# Patient Record
Sex: FEMALE | Race: WHITE | NOT HISPANIC OR LATINO | URBAN - METROPOLITAN AREA
[De-identification: names, ages, dates, MRNs, and addresses within clinical notes are randomized per-mention and may not be internally consistent; named-entity substitution may affect disease eponyms.]

---

## 2017-05-05 ENCOUNTER — OFFICE VISIT - HEALTHEAST (OUTPATIENT)
Dept: FAMILY MEDICINE | Facility: CLINIC | Age: 52
End: 2017-05-05

## 2017-05-05 ENCOUNTER — HOSPITAL ENCOUNTER (OUTPATIENT)
Dept: MAMMOGRAPHY | Facility: HOSPITAL | Age: 52
Discharge: HOME | End: 2017-05-05
Attending: FAMILY MEDICINE

## 2017-05-05 DIAGNOSIS — R32 URINARY INCONTINENCE: ICD-10-CM

## 2017-05-05 DIAGNOSIS — E11.9 TYPE 2 DIABETES MELLITUS (H): ICD-10-CM

## 2017-05-05 DIAGNOSIS — Z00.00 ROUTINE GENERAL MEDICAL EXAMINATION AT A HEALTH CARE FACILITY: ICD-10-CM

## 2017-05-05 DIAGNOSIS — E78.5 HYPERLIPIDEMIA: ICD-10-CM

## 2017-05-05 DIAGNOSIS — Z12.31 VISIT FOR SCREENING MAMMOGRAM: ICD-10-CM

## 2017-05-05 ASSESSMENT — MIFFLIN-ST. JEOR: SCORE: 1394.67

## 2017-05-09 ENCOUNTER — HOSPITAL ENCOUNTER (OUTPATIENT)
Dept: MAMMOGRAPHY | Facility: HOSPITAL | Age: 52
Discharge: HOME | End: 2017-05-09
Attending: FAMILY MEDICINE

## 2017-05-09 DIAGNOSIS — N63.0 BREAST MASS: ICD-10-CM

## 2017-08-24 ENCOUNTER — COMMUNICATION - HEALTHEAST (OUTPATIENT)
Dept: FAMILY MEDICINE | Facility: CLINIC | Age: 52
End: 2017-08-24

## 2017-08-28 ENCOUNTER — COMMUNICATION - HEALTHEAST (OUTPATIENT)
Dept: FAMILY MEDICINE | Facility: CLINIC | Age: 52
End: 2017-08-28

## 2017-08-28 ENCOUNTER — OFFICE VISIT - HEALTHEAST (OUTPATIENT)
Dept: FAMILY MEDICINE | Facility: CLINIC | Age: 52
End: 2017-08-28

## 2017-08-28 DIAGNOSIS — E11.9 TYPE 2 DIABETES MELLITUS (H): ICD-10-CM

## 2017-08-28 DIAGNOSIS — F32.9 MAJOR DEPRESSION: ICD-10-CM

## 2017-08-28 DIAGNOSIS — F41.9 ANXIETY: ICD-10-CM

## 2017-08-28 DIAGNOSIS — R53.83 FATIGUE: ICD-10-CM

## 2017-08-28 LAB
HBA1C MFR BLD: 7.7 % (ref 3.5–6)
LDLC SERPL CALC-MCNC: 87 MG/DL

## 2017-08-28 ASSESSMENT — MIFFLIN-ST. JEOR: SCORE: 1408.28

## 2017-08-29 ENCOUNTER — COMMUNICATION - HEALTHEAST (OUTPATIENT)
Dept: FAMILY MEDICINE | Facility: CLINIC | Age: 52
End: 2017-08-29

## 2017-09-01 ENCOUNTER — OFFICE VISIT - HEALTHEAST (OUTPATIENT)
Dept: FAMILY MEDICINE | Facility: CLINIC | Age: 52
End: 2017-09-01

## 2017-09-01 DIAGNOSIS — F32.9 MAJOR DEPRESSION: ICD-10-CM

## 2017-09-01 DIAGNOSIS — F41.9 ANXIETY: ICD-10-CM

## 2017-09-01 DIAGNOSIS — M54.2 NECK PAIN: ICD-10-CM

## 2017-09-01 ASSESSMENT — MIFFLIN-ST. JEOR: SCORE: 1408.28

## 2017-09-08 ENCOUNTER — OFFICE VISIT - HEALTHEAST (OUTPATIENT)
Dept: FAMILY MEDICINE | Facility: CLINIC | Age: 52
End: 2017-09-08

## 2017-09-08 ENCOUNTER — COMMUNICATION - HEALTHEAST (OUTPATIENT)
Dept: FAMILY MEDICINE | Facility: CLINIC | Age: 52
End: 2017-09-08

## 2017-09-08 DIAGNOSIS — F41.9 ANXIETY: ICD-10-CM

## 2017-09-08 DIAGNOSIS — F32.9 MAJOR DEPRESSION: ICD-10-CM

## 2017-09-08 ASSESSMENT — MIFFLIN-ST. JEOR: SCORE: 1408.28

## 2017-10-03 ENCOUNTER — COMMUNICATION - HEALTHEAST (OUTPATIENT)
Dept: SCHEDULING | Facility: CLINIC | Age: 52
End: 2017-10-03

## 2017-10-03 ENCOUNTER — OFFICE VISIT - HEALTHEAST (OUTPATIENT)
Dept: FAMILY MEDICINE | Facility: CLINIC | Age: 52
End: 2017-10-03

## 2017-10-03 DIAGNOSIS — R35.0 URINARY FREQUENCY: ICD-10-CM

## 2017-10-03 DIAGNOSIS — E87.6 HYPOKALEMIA: ICD-10-CM

## 2017-10-04 ENCOUNTER — COMMUNICATION - HEALTHEAST (OUTPATIENT)
Dept: FAMILY MEDICINE | Facility: CLINIC | Age: 52
End: 2017-10-04

## 2017-10-05 ENCOUNTER — COMMUNICATION - HEALTHEAST (OUTPATIENT)
Dept: FAMILY MEDICINE | Facility: CLINIC | Age: 52
End: 2017-10-05

## 2017-10-06 ENCOUNTER — COMMUNICATION - HEALTHEAST (OUTPATIENT)
Dept: FAMILY MEDICINE | Facility: CLINIC | Age: 52
End: 2017-10-06

## 2017-10-06 ENCOUNTER — AMBULATORY - HEALTHEAST (OUTPATIENT)
Dept: FAMILY MEDICINE | Facility: CLINIC | Age: 52
End: 2017-10-06

## 2017-10-06 ENCOUNTER — AMBULATORY - HEALTHEAST (OUTPATIENT)
Dept: LAB | Facility: CLINIC | Age: 52
End: 2017-10-06

## 2017-10-06 DIAGNOSIS — E87.6 HYPOKALEMIA: ICD-10-CM

## 2017-10-13 ENCOUNTER — AMBULATORY - HEALTHEAST (OUTPATIENT)
Dept: LAB | Facility: CLINIC | Age: 52
End: 2017-10-13

## 2017-10-13 DIAGNOSIS — E87.6 HYPOKALEMIA: ICD-10-CM

## 2017-10-24 ENCOUNTER — COMMUNICATION - HEALTHEAST (OUTPATIENT)
Dept: FAMILY MEDICINE | Facility: CLINIC | Age: 52
End: 2017-10-24

## 2017-10-27 ENCOUNTER — AMBULATORY - HEALTHEAST (OUTPATIENT)
Dept: NURSING | Facility: CLINIC | Age: 52
End: 2017-10-27

## 2017-10-27 DIAGNOSIS — Z23 FLU VACCINE NEED: ICD-10-CM

## 2017-12-10 ENCOUNTER — HOSPITAL ENCOUNTER (EMERGENCY)
Facility: CLINIC | Age: 52
Discharge: HOME | End: 2017-12-10
Attending: PHYSICIAN ASSISTANT | Admitting: PHYSICIAN ASSISTANT
Payer: COMMERCIAL

## 2017-12-10 VITALS
HEART RATE: 84 BPM | OXYGEN SATURATION: 95 % | TEMPERATURE: 97.7 F | SYSTOLIC BLOOD PRESSURE: 96 MMHG | HEIGHT: 66 IN | RESPIRATION RATE: 16 BRPM | DIASTOLIC BLOOD PRESSURE: 61 MMHG

## 2017-12-10 DIAGNOSIS — R10.2 PELVIC PAIN IN FEMALE: ICD-10-CM

## 2017-12-10 LAB
ALBUMIN SERPL-MCNC: 3.5 G/DL (ref 3.4–5)
ALBUMIN UR-MCNC: NEGATIVE MG/DL
ALP SERPL-CCNC: 63 U/L (ref 40–150)
ALT SERPL W P-5'-P-CCNC: 18 U/L (ref 0–50)
ANION GAP SERPL CALCULATED.3IONS-SCNC: 8 MMOL/L (ref 3–14)
APPEARANCE UR: ABNORMAL
AST SERPL W P-5'-P-CCNC: 16 U/L (ref 0–45)
BACTERIA #/AREA URNS HPF: ABNORMAL /HPF
BASOPHILS # BLD AUTO: 0 10E9/L (ref 0–0.2)
BASOPHILS NFR BLD AUTO: 0.5 %
BILIRUB SERPL-MCNC: 0.5 MG/DL (ref 0.2–1.3)
BILIRUB UR QL STRIP: NEGATIVE
BUN SERPL-MCNC: 12 MG/DL (ref 7–30)
CALCIUM SERPL-MCNC: 8 MG/DL (ref 8.5–10.1)
CHLORIDE SERPL-SCNC: 108 MMOL/L (ref 94–109)
CO2 SERPL-SCNC: 24 MMOL/L (ref 20–32)
COLOR UR AUTO: YELLOW
CREAT SERPL-MCNC: 0.68 MG/DL (ref 0.52–1.04)
DIFFERENTIAL METHOD BLD: NORMAL
EOSINOPHIL # BLD AUTO: 0.2 10E9/L (ref 0–0.7)
EOSINOPHIL NFR BLD AUTO: 2.2 %
ERYTHROCYTE [DISTWIDTH] IN BLOOD BY AUTOMATED COUNT: 12.9 % (ref 10–15)
GFR SERPL CREATININE-BSD FRML MDRD: >90 ML/MIN/1.7M2
GLUCOSE SERPL-MCNC: 209 MG/DL (ref 70–99)
GLUCOSE UR STRIP-MCNC: NEGATIVE MG/DL
HCT VFR BLD AUTO: 39.5 % (ref 35–47)
HGB BLD-MCNC: 13.8 G/DL (ref 11.7–15.7)
HGB UR QL STRIP: NEGATIVE
IMM GRANULOCYTES # BLD: 0 10E9/L (ref 0–0.4)
IMM GRANULOCYTES NFR BLD: 0.2 %
KETONES UR STRIP-MCNC: NEGATIVE MG/DL
LEUKOCYTE ESTERASE UR QL STRIP: NEGATIVE
LIPASE SERPL-CCNC: 244 U/L (ref 73–393)
LYMPHOCYTES # BLD AUTO: 2.1 10E9/L (ref 0.8–5.3)
LYMPHOCYTES NFR BLD AUTO: 26.2 %
MCH RBC QN AUTO: 30.7 PG (ref 26.5–33)
MCHC RBC AUTO-ENTMCNC: 34.9 G/DL (ref 31.5–36.5)
MCV RBC AUTO: 88 FL (ref 78–100)
MONOCYTES # BLD AUTO: 0.7 10E9/L (ref 0–1.3)
MONOCYTES NFR BLD AUTO: 8.3 %
MUCOUS THREADS #/AREA URNS LPF: PRESENT /LPF
NEUTROPHILS # BLD AUTO: 5 10E9/L (ref 1.6–8.3)
NEUTROPHILS NFR BLD AUTO: 62.6 %
NITRATE UR QL: NEGATIVE
PH UR STRIP: 6 PH (ref 5–7)
PLATELET # BLD AUTO: 249 10E9/L (ref 150–450)
POTASSIUM SERPL-SCNC: 3 MMOL/L (ref 3.4–5.3)
PROT SERPL-MCNC: 6.9 G/DL (ref 6.8–8.8)
RBC # BLD AUTO: 4.49 10E12/L (ref 3.8–5.2)
RBC #/AREA URNS AUTO: 1 /HPF (ref 0–2)
SODIUM SERPL-SCNC: 140 MMOL/L (ref 133–144)
SOURCE: ABNORMAL
SP GR UR STRIP: 1.02 (ref 1–1.03)
SPECIMEN SOURCE: NORMAL
SQUAMOUS #/AREA URNS AUTO: 6 /HPF (ref 0–1)
UROBILINOGEN UR STRIP-MCNC: NORMAL MG/DL (ref 0–2)
WBC # BLD AUTO: 8.1 10E9/L (ref 4–11)
WBC #/AREA URNS AUTO: 1 /HPF (ref 0–2)
WET PREP SPEC: NORMAL

## 2017-12-10 PROCEDURE — 99284 EMERGENCY DEPT VISIT MOD MDM: CPT | Mod: 25 | Performed by: PHYSICIAN ASSISTANT

## 2017-12-10 PROCEDURE — 80053 COMPREHEN METABOLIC PANEL: CPT | Performed by: PHYSICIAN ASSISTANT

## 2017-12-10 PROCEDURE — 93005 ELECTROCARDIOGRAM TRACING: CPT | Performed by: PHYSICIAN ASSISTANT

## 2017-12-10 PROCEDURE — 93010 ELECTROCARDIOGRAM REPORT: CPT | Mod: Z6 | Performed by: PHYSICIAN ASSISTANT

## 2017-12-10 PROCEDURE — 87210 SMEAR WET MOUNT SALINE/INK: CPT | Performed by: PHYSICIAN ASSISTANT

## 2017-12-10 PROCEDURE — 85025 COMPLETE CBC W/AUTO DIFF WBC: CPT | Performed by: PHYSICIAN ASSISTANT

## 2017-12-10 PROCEDURE — 25000128 H RX IP 250 OP 636: Performed by: PHYSICIAN ASSISTANT

## 2017-12-10 PROCEDURE — 83690 ASSAY OF LIPASE: CPT | Performed by: PHYSICIAN ASSISTANT

## 2017-12-10 PROCEDURE — 81001 URINALYSIS AUTO W/SCOPE: CPT | Performed by: PHYSICIAN ASSISTANT

## 2017-12-10 PROCEDURE — 96374 THER/PROPH/DIAG INJ IV PUSH: CPT | Performed by: PHYSICIAN ASSISTANT

## 2017-12-10 RX ORDER — METFORMIN HCL 500 MG
2000 TABLET, EXTENDED RELEASE 24 HR ORAL
COMMUNITY

## 2017-12-10 RX ORDER — POLYETHYLENE GLYCOL 3350 17 G
2 POWDER IN PACKET (EA) ORAL
COMMUNITY

## 2017-12-10 RX ORDER — KETOROLAC TROMETHAMINE 30 MG/ML
30 INJECTION, SOLUTION INTRAMUSCULAR; INTRAVENOUS ONCE
Status: COMPLETED | OUTPATIENT
Start: 2017-12-10 | End: 2017-12-10

## 2017-12-10 RX ADMIN — KETOROLAC TROMETHAMINE 30 MG: 30 INJECTION, SOLUTION INTRAMUSCULAR at 18:06

## 2017-12-10 NOTE — ED NOTES
Pt c/o lower abdominal pain - states feels like heavy pressure in bladder area - states has had bladder sling placed several years ago - states uretheral pain/ burning with urination. Patient denies any vomiting - states she normally has loose stools so, constipation is not of concern.

## 2017-12-10 NOTE — ED AVS SNAPSHOT
[facility] Emergency Department    [address] [place] [address]    Phone:  [phone]    Fax:  [phone]                                       Deyanira Kelley   MRN: [id number]    Department:  [facility] Emergency Department   Date of Visit:  12/10/2017           Patient Information     Date Of Birth          1965        Your diagnoses for this visit were:     Pelvic pain in female        You were seen by Jackie Teran PA-C.      Follow-up Information     Follow up with Israel Alva MD In 3 days.    Specialty:  Family Practice    Why:  if no improvement or sooner if new or worsening symptoms     Contact information:    Replaced by [facility]  [address]  [facility] [address]  [phone]        Discharge References/Attachments     PELVIC PAIN, UNKNOWN CAUSE (ENGLISH)      24 Hour Appointment Hotline       To make an appointment at any [facility], call [phone] ([phone]). If you don't have a family doctor or clinic, we will help you find one. [facility] are conveniently located to serve the needs of you and your family.             Review of your medicines      Our records show that you are taking the medicines listed below. If these are incorrect, please call your family doctor or clinic.        Dose / Directions Last dose taken    ATORVASTATIN CALCIUM PO   Dose:  20 mg        Take 20 mg by mouth daily   Refills:  0        GLIPIZIDE XL PO   Dose:  15 mg        Take 15 mg by mouth daily (with breakfast)   Refills:  0        metFORMIN 500 MG 24 hr tablet   Commonly known as:  GLUCOPHAGE-XR   Dose:  2000 mg        Take 2,000 mg by mouth daily (with breakfast)   Refills:  0        NICOTINE MINI 2 MG lozenge   Dose:  2 mg   Generic drug:  nicotine polacrilex        Place 2 mg inside cheek every hour as needed for smoking cessation   Refills:  0        POTASSIUM CHLORIDE ER PO   Dose:  10 mEq        Take 10 mEq by mouth daily   Refills:  0        SERTRALINE  HCL PO   Dose:  100 mg        Take 100 mg by mouth daily   Refills:  0                Procedures and tests performed during your visit     CBC with platelets differential    Comprehensive metabolic panel    EKG 12 lead    Lipase    UA reflex to Microscopic and Culture    Wet prep      Orders Needing Specimen Collection     None      Pending Results     No orders found from 12/8/2017 to 12/11/2017.            Pending Culture Results     No orders found from 12/8/2017 to 12/11/2017.            Pending Results Instructions     If you had any lab results that were not finalized at the time of your Discharge, you can call the ED Lab Result RN at [phone]. You will be contacted by this team for any positive Lab results or changes in treatment. The nurses are available 7 days a week from 10A to 6:30P.  You can leave a message 24 hours per day and they will return your call.        Test Results From Your Hospital Stay        12/10/2017  4:52 PM      Component Results     Component Value Ref Range & Units Status    Color Urine Yellow  Final    Appearance Urine Slightly Cloudy  Final    Glucose Urine Negative NEG^Negative mg/dL Final    Bilirubin Urine Negative NEG^Negative Final    Ketones Urine Negative NEG^Negative mg/dL Final    Specific Gravity Urine 1.022 1.003 - 1.035 Final    Blood Urine Negative NEG^Negative Final    pH Urine 6.0 5.0 - 7.0 pH Final    Protein Albumin Urine Negative NEG^Negative mg/dL Final    Urobilinogen mg/dL Normal 0.0 - 2.0 mg/dL Final    Nitrite Urine Negative NEG^Negative Final    Leukocyte Esterase Urine Negative NEG^Negative Final    Source Midstream Urine  Final    RBC Urine 1 0 - 2 /HPF Final    WBC Urine 1 0 - 2 /HPF Final    Bacteria Urine Few (A) NEG^Negative /HPF Final    Squamous Epithelial /HPF Urine 6 (H) 0 - 1 /HPF Final    Mucous Urine Present (A) NEG^Negative /LPF Final         12/10/2017  5:17 PM      Component Results     Component Value Ref Range & Units Status    WBC 8.1  4.0 - 11.0 10e9/L Final    RBC Count 4.49 3.8 - 5.2 10e12/L Final    Hemoglobin 13.8 11.7 - 15.7 g/dL Final    Hematocrit 39.5 35.0 - 47.0 % Final    MCV 88 78 - 100 fl Final    MCH 30.7 26.5 - 33.0 pg Final    MCHC 34.9 31.5 - 36.5 g/dL Final    RDW 12.9 10.0 - 15.0 % Final    Platelet Count 249 150 - 450 10e9/L Final    Diff Method Automated Method  Final    % Neutrophils 62.6 % Final    % Lymphocytes 26.2 % Final    % Monocytes 8.3 % Final    % Eosinophils 2.2 % Final    % Basophils 0.5 % Final    % Immature Granulocytes 0.2 % Final    Absolute Neutrophil 5.0 1.6 - 8.3 10e9/L Final    Absolute Lymphocytes 2.1 0.8 - 5.3 10e9/L Final    Absolute Monocytes 0.7 0.0 - 1.3 10e9/L Final    Absolute Eosinophils 0.2 0.0 - 0.7 10e9/L Final    Absolute Basophils 0.0 0.0 - 0.2 10e9/L Final    Abs Immature Granulocytes 0.0 0 - 0.4 10e9/L Final         12/10/2017  5:28 PM      Component Results     Component Value Ref Range & Units Status    Sodium 140 133 - 144 mmol/L Final    Potassium 3.0 (L) 3.4 - 5.3 mmol/L Final    Chloride 108 94 - 109 mmol/L Final    Carbon Dioxide 24 20 - 32 mmol/L Final    Anion Gap 8 3 - 14 mmol/L Final    Glucose 209 (H) 70 - 99 mg/dL Final    Urea Nitrogen 12 7 - 30 mg/dL Final    Creatinine 0.68 0.52 - 1.04 mg/dL Final    GFR Estimate >90 >60 mL/min/1.7m2 Final    Non  GFR Calc    GFR Estimate If Black >90 >60 mL/min/1.7m2 Final    African American GFR Calc    Calcium 8.0 (L) 8.5 - 10.1 mg/dL Final    Bilirubin Total 0.5 0.2 - 1.3 mg/dL Final    Albumin 3.5 3.4 - 5.0 g/dL Final    Protein Total 6.9 6.8 - 8.8 g/dL Final    Alkaline Phosphatase 63 40 - 150 U/L Final    ALT 18 0 - 50 U/L Final    AST 16 0 - 45 U/L Final         12/10/2017  5:28 PM      Component Results     Component Value Ref Range & Units Status    Lipase 244 73 - 393 U/L Final         12/10/2017  7:14 PM      Component Results     Component    Specimen Description    Cervix    Wet Prep    No yeast seen    Wet Prep  "   No Trichomonas seen    Wet Prep    No clue cells seen    Wet Prep    Few  WBC'S seen                  Thank you for choosing [facility]       Thank you for choosing [facility] for your care. Our goal is always to provide you with excellent care. Hearing back from our patients is one way we can continue to improve our services. Please take a few minutes to complete the written survey that you may receive in the mail after you visit with us. Thank you!        [employer] Information     [employer] lets you send messages to your doctor, view your test results, renew your prescriptions, schedule appointments and more. To sign up, go to [email or web address] . Click on \"Log in\" on the left side of the screen, which will take you to the Welcome page. Then click on \"Sign up Now\" on the right side of the page.     You will be asked to enter the access code listed below, as well as some personal information. Please follow the directions to create your username and password.     Your access code is: [email or web address]  Expires: 3/10/2018  7:40 PM     Your access code will  in 90 days. If you need help or a new code, please call your [facility] clinic or [phone].        Care EveryWhere ID     This is your Care EveryWhere ID. This could be used by other organizations to access your [facility] medical records  [id number]        Equal Access to Services     ZHANG ANTOINE : Danette Nevarez, waaxda luqadaha, qaybta kaalmada janessa, reese cobos . So [facility] [phone].    ATENCIÓN: Si habla español, tiene a eden disposición servicios gratuitos de asistencia lingüística. Llame al [phone].    We comply with applicable federal civil rights laws and [place] laws. We do not discriminate on the basis of race, color, national origin, age, disability, sex, sexual orientation, or gender identity.            After Visit Summary       This is your record. Keep this with you and show to your " community pharmacist(s) and doctor(s) at your next visit.

## 2017-12-10 NOTE — ED AVS SNAPSHOT
[facility] Emergency Department    [address] [facility] [address]    Phone:  [phone]    Fax:  [phone]                                       Deyanira Kelley   MRN: [id number]    Department:  [facility] Emergency Department   Date of Visit:  12/10/2017           After Visit Summary Signature Page     I have received my discharge instructions, and my questions have been answered. I have discussed any challenges I see with this plan with the nurse or doctor.    ..........................................................................................................................................  Patient/Patient Representative Signature      ..........................................................................................................................................  Patient Representative Print Name and Relationship to Patient    ..................................................               ................................................  Date                                            Time    ..........................................................................................................................................  Reviewed by Signature/Title    ...................................................              ..............................................  Date                                                            Time

## 2017-12-13 ASSESSMENT — ENCOUNTER SYMPTOMS
COUGH: 0
DIARRHEA: 1
ANAL BLEEDING: 0
SORE THROAT: 0
ABDOMINAL DISTENTION: 0
BLOOD IN STOOL: 0
WHEEZING: 0
UNEXPECTED WEIGHT CHANGE: 0
DIZZINESS: 0
LIGHT-HEADEDNESS: 0
NAUSEA: 0
NECK PAIN: 0
ABDOMINAL PAIN: 0
WOUND: 0
HEMATURIA: 0
APPETITE CHANGE: 0
BACK PAIN: 0
FREQUENCY: 1
FEVER: 0
FLANK PAIN: 1
CHILLS: 1
DIAPHORESIS: 0
DIFFICULTY URINATING: 0
RECTAL PAIN: 0
SHORTNESS OF BREATH: 0
ACTIVITY CHANGE: 0
VOMITING: 0
CONSTIPATION: 0
DYSURIA: 1
COLOR CHANGE: 0

## 2017-12-13 NOTE — ED PROVIDER NOTES
History     Chief Complaint   Patient presents with     Abdominal Pain     started friday night, low abd pain     HPI  Deyanira Kelley is a 52 year old female past medical history significant for depression, hyperlipidemia, hypokalemia who presents to the emergency department with concern over lower pelvic pain which is unpleasant for the last 3 days.  Patient states that pain is generally improved upon waking and gradually worsened throughout the day.  Her pain radiates to the right flank.  She describes it as a sharp.  She has additionally noted some dysuria, increased urinary urgency, chills, myalgias.  She denies any objective fever.  She has not had any significant cough, chest pain, dyspnea, wheezing, vomiting.  She has also noted some vaginal itching, burning last wee which has since improved.  She denies any vaginal discharge. She states that she has a history of lymphocytic colitis and normally has 3-5 small bowel movements per day which are soft to watery this has not changed in frequency, consistency since onset of her pelvic pain.  She has not had any melena, hematochezia.  She has not attempted any OTC treatment today.  Her past surgical history is significant for a bladder sling, hysterectomy in 2009, prior single oophrectomy however patient states one ovary remains.  She believes that she may have had an appendectomy at time of her initial oophorectomy however is not completely sure.    Problem List:    There are no active problems to display for this patient.       Past Medical History:    No past medical history on file.    Past Surgical History:    No past surgical history on file.    Family History:    No family history on file.    Social History:  Marital Status:   [2]  Social History   Substance Use Topics     Smoking status: Not on file     Smokeless tobacco: Not on file     Alcohol use Not on file      Medications:      SERTRALINE HCL PO   GLIPIZIDE XL PO   metFORMIN (GLUCOPHAGE-XR) 500 MG  "24 hr tablet   ATORVASTATIN CALCIUM PO   POTASSIUM CHLORIDE ER PO   nicotine polacrilex (NICOTINE MINI) 2 MG lozenge     Review of Systems   Constitutional: Positive for chills. Negative for activity change, appetite change, diaphoresis, fever and unexpected weight change.   HENT: Negative for congestion and sore throat.    Respiratory: Negative for cough, shortness of breath and wheezing.    Cardiovascular: Negative for chest pain.   Gastrointestinal: Positive for diarrhea (chronic). Negative for abdominal distention, abdominal pain, anal bleeding, blood in stool, constipation, nausea, rectal pain and vomiting.   Genitourinary: Positive for dysuria, flank pain, frequency, pelvic pain and urgency. Negative for decreased urine volume, difficulty urinating, hematuria, vaginal bleeding and vaginal discharge.   Musculoskeletal: Negative for back pain and neck pain.   Skin: Negative for color change, rash and wound.   Neurological: Negative for dizziness and light-headedness.       Physical Exam   BP: 135/85  Pulse: 84  Temp: 97.7  F (36.5  C)  Resp: 16  Height: 167.6 cm (5' 6\")  SpO2: 98 %    Physical Exam   Constitutional: She is oriented to person, place, and time. She appears well-developed and well-nourished. She appears distressed (mild patient appears in pain).   HENT:   Head: Normocephalic and atraumatic.   Mouth/Throat: No oropharyngeal exudate.   Eyes: Conjunctivae and EOM are normal. Pupils are equal, round, and reactive to light.   Neck: Normal range of motion.   Cardiovascular: Normal rate, regular rhythm and normal heart sounds.  Exam reveals no gallop and no friction rub.    No murmur heard.  Pulmonary/Chest: Effort normal and breath sounds normal. No respiratory distress. She has no wheezes. She has no rales.   Abdominal: Soft. Bowel sounds are normal. There is no hepatosplenomegaly. There is tenderness (suprabubic ). There is no rebound, no guarding and no CVA tenderness. No hernia.   Genitourinary: " Rectum normal. Rectal exam shows no mass and no tenderness. Pelvic exam was performed with patient prone. No erythema, tenderness or bleeding in the vagina. No foreign body in the vagina. No signs of injury around the vagina. Vaginal discharge (small amount of thick white) found.   Neurological: She is alert and oriented to person, place, and time.   Skin: Skin is warm and dry. No rash noted. No erythema.       ED Course     ED Course     Procedures         EKG Interpretation:      Interpreted by Jackie Teran and Dr. Marcus Schneider   Time reviewed: 5:45   Symptoms at time of EKG: pelvic pain, hypokalemia noted on blood work    Rhythm: normal sinus   Rate: normal  Axis: NORMAL  Ectopy: none  Conduction: normal  ST Segments/ T Waves: Nonspecific T wave changes  Q Waves: none  Comparison to prior: No old EKG available    Clinical Impression: normal EKG            Critical Care time:  none          Labs Ordered and Resulted from Time of ED Arrival Up to the Time of Departure from the ED   UA MACROSCOPIC WITH REFLEX TO MICRO AND CULTURE - Abnormal; Notable for the following:        Result Value    Bacteria Urine Few (*)     Squamous Epithelial /HPF Urine 6 (*)     Mucous Urine Present (*)     All other components within normal limits   COMPREHENSIVE METABOLIC PANEL - Abnormal; Notable for the following:     Potassium 3.0 (*)     Glucose 209 (*)     Calcium 8.0 (*)     All other components within normal limits   CBC WITH PLATELETS DIFFERENTIAL   LIPASE   WET PREP     Assessments & Plan (with Medical Decision Making)     I have reviewed the nursing notes.    I have reviewed the findings, diagnosis, plan and need for follow up with the patient.       Discharge Medication List as of 12/10/2017  7:41 PM        Final diagnoses:   Pelvic pain in female     52-year-old female presents to the emergency department with concern over 3 day history of pelvic pain which is improved upon awakening and gradually worsened throughout the  day.  Patient had stable vital signs upon arrival.  Physical exam findings as described above.  As part of evaluation to have laboratory tests including CBC which was normal, CMP did show hypokalemia, elevated glucose consistent with patient's past medical history.  Lipase and wet prep were negative.  Urinalysis did show some bacteria however was contaminated with skin was not definitive for infection and it was not typical of pyelonephritis.  I will defer antibiotics pending urine culture.  There was no hematuria noted on urinalysis and her pain is not typical for nephrolithiasis symptoms.  Symptoms are consistent with pelvic floor pain.  Differential would also include her lymphocytic colitis.  I did discuss case with ER physician Dr. Marcus Schneider who reviewed laboratory testing and did not recommend imaging at this time.  Patient was given single dose of Toradol in the department with some improvement of her pain however no complete resolution.  She was discharged home stable with instructions to follow-up with her primary care provider within the next 3-5 days.  Worrisome reasons to return to ER sooner discussed.      Disclaimer: This note consists of symbols derived from keyboarding, dictation, and/or voice recognition software. As a result, there may be errors in the script that have gone undetected.  Please consider this when interpreting information found in the chart.    12/10/2017   [facility] EMERGENCY DEPARTMENT     Jackie Teran PA-C  12/13/17 0701

## 2017-12-14 ENCOUNTER — AMBULATORY - HEALTHEAST (OUTPATIENT)
Dept: FAMILY MEDICINE | Facility: CLINIC | Age: 52
End: 2017-12-14

## 2017-12-14 ENCOUNTER — OFFICE VISIT - HEALTHEAST (OUTPATIENT)
Dept: FAMILY MEDICINE | Facility: CLINIC | Age: 52
End: 2017-12-14

## 2017-12-14 DIAGNOSIS — K59.00 CONSTIPATION: ICD-10-CM

## 2017-12-14 DIAGNOSIS — R10.9 ABDOMINAL PAIN: ICD-10-CM

## 2017-12-15 ENCOUNTER — COMMUNICATION - HEALTHEAST (OUTPATIENT)
Dept: FAMILY MEDICINE | Facility: CLINIC | Age: 52
End: 2017-12-15

## 2018-04-26 ENCOUNTER — AMBULATORY - HEALTHEAST (OUTPATIENT)
Dept: SLEEP MEDICINE | Facility: CLINIC | Age: 53
End: 2018-04-26

## 2018-04-26 DIAGNOSIS — G47.33 OBSTRUCTIVE SLEEP APNEA SYNDROME: ICD-10-CM

## 2018-04-27 ENCOUNTER — RECORDS - HEALTHEAST (OUTPATIENT)
Dept: ADMINISTRATIVE | Facility: OTHER | Age: 53
End: 2018-04-27

## 2018-04-30 ENCOUNTER — RECORDS - HEALTHEAST (OUTPATIENT)
Dept: ADMINISTRATIVE | Facility: OTHER | Age: 53
End: 2018-04-30

## 2018-06-15 ENCOUNTER — OFFICE VISIT - HEALTHEAST (OUTPATIENT)
Dept: SLEEP MEDICINE | Facility: CLINIC | Age: 53
End: 2018-06-15

## 2018-06-15 DIAGNOSIS — G47.00 INSOMNIA: ICD-10-CM

## 2018-06-15 DIAGNOSIS — Z91.89 AT RISK FOR SLEEP APNEA: ICD-10-CM

## 2018-06-15 DIAGNOSIS — R06.83 SNORING: ICD-10-CM

## 2018-06-15 DIAGNOSIS — G47.10 HYPERSOMNIA: ICD-10-CM

## 2018-06-15 ASSESSMENT — MIFFLIN-ST. JEOR: SCORE: 1430.96

## 2018-07-11 ENCOUNTER — HOSPITAL ENCOUNTER (EMERGENCY)
Facility: CLINIC | Age: 53
Discharge: HOME | End: 2018-07-11
Attending: FAMILY MEDICINE | Admitting: FAMILY MEDICINE
Payer: COMMERCIAL

## 2018-07-11 ENCOUNTER — APPOINTMENT (OUTPATIENT)
Dept: CT IMAGING | Facility: CLINIC | Age: 53
End: 2018-07-11
Attending: FAMILY MEDICINE
Payer: COMMERCIAL

## 2018-07-11 VITALS
SYSTOLIC BLOOD PRESSURE: 146 MMHG | WEIGHT: 180 LBS | RESPIRATION RATE: 20 BRPM | BODY MASS INDEX: 29.05 KG/M2 | TEMPERATURE: 98.4 F | OXYGEN SATURATION: 98 % | DIASTOLIC BLOOD PRESSURE: 87 MMHG

## 2018-07-11 DIAGNOSIS — M54.50 ACUTE EXACERBATION OF CHRONIC LOW BACK PAIN: ICD-10-CM

## 2018-07-11 DIAGNOSIS — G89.29 ACUTE EXACERBATION OF CHRONIC LOW BACK PAIN: ICD-10-CM

## 2018-07-11 PROCEDURE — 96361 HYDRATE IV INFUSION ADD-ON: CPT | Performed by: FAMILY MEDICINE

## 2018-07-11 PROCEDURE — 96374 THER/PROPH/DIAG INJ IV PUSH: CPT | Performed by: FAMILY MEDICINE

## 2018-07-11 PROCEDURE — 99285 EMERGENCY DEPT VISIT HI MDM: CPT | Mod: 25 | Performed by: FAMILY MEDICINE

## 2018-07-11 PROCEDURE — 72131 CT LUMBAR SPINE W/O DYE: CPT

## 2018-07-11 PROCEDURE — 96375 TX/PRO/DX INJ NEW DRUG ADDON: CPT | Performed by: FAMILY MEDICINE

## 2018-07-11 PROCEDURE — 99284 EMERGENCY DEPT VISIT MOD MDM: CPT | Mod: Z6 | Performed by: FAMILY MEDICINE

## 2018-07-11 PROCEDURE — 25000128 H RX IP 250 OP 636: Performed by: FAMILY MEDICINE

## 2018-07-11 RX ORDER — OXYCODONE HYDROCHLORIDE 5 MG/1
5 TABLET ORAL EVERY 6 HOURS PRN
Qty: 12 TABLET | Refills: 0 | Status: SHIPPED | OUTPATIENT
Start: 2018-07-11

## 2018-07-11 RX ORDER — ONDANSETRON 2 MG/ML
4 INJECTION INTRAMUSCULAR; INTRAVENOUS ONCE
Status: COMPLETED | OUTPATIENT
Start: 2018-07-11 | End: 2018-07-11

## 2018-07-11 RX ORDER — DICLOFENAC SODIUM 75 MG/1
75 TABLET, DELAYED RELEASE ORAL 2 TIMES DAILY PRN
Qty: 30 TABLET | Refills: 0 | Status: SHIPPED | OUTPATIENT
Start: 2018-07-11

## 2018-07-11 RX ADMIN — SODIUM CHLORIDE 1000 ML: 9 INJECTION, SOLUTION INTRAVENOUS at 04:49

## 2018-07-11 RX ADMIN — ONDANSETRON HYDROCHLORIDE 4 MG: 2 INJECTION, SOLUTION INTRAMUSCULAR; INTRAVENOUS at 04:50

## 2018-07-11 RX ADMIN — HYDROMORPHONE HYDROCHLORIDE 1 MG: 1 INJECTION, SOLUTION INTRAMUSCULAR; INTRAVENOUS; SUBCUTANEOUS at 04:50

## 2018-07-11 ASSESSMENT — PAIN DESCRIPTION - DESCRIPTORS: DESCRIPTORS: ACHING

## 2018-07-11 NOTE — LETTER
July 11, 2018      To Whom It May Concern:      Deyanira Kelley was seen in our Emergency Department today, 07/11/18.  I expect her condition to improve over the next 2-3 days.  She may return to work/school when improved.    Sincerely,        Mark Montalvo MD

## 2018-07-11 NOTE — ED PROVIDER NOTES
History     Chief Complaint   Patient presents with     Back Pain     HPI  Deyanira Kelley is a 53 year old female, past medical history is significant for hypercholesterolemia, type 2 diabetes, presents to the emergency department with concerns of 6 months of back pain.  History is obtained from patient who states that he began with gradual episodic central and slightly right low back pain beginning approximately 6 months prior to presentation.  Approximately 3 months prior to presentation it became worse and was more predominantly in the right low back/SI joint area.  She saw her provider approximately a month ago and had x-ray demonstrating bilateral SI joint arthritis.  She has been using over-the-counter pain medications such as Tylenol and ibuprofen with minimal improvement in pain.  She notes more frequent flareups of the low back pain where tonight she could not sleep and came to the emergency department.  She notes no bowel or bladder dysfunction.  There are no radicular symptoms going down the legs.      Problem List:    There are no active problems to display for this patient.       Past Medical History:    No past medical history on file.    Past Surgical History:    No past surgical history on file.    Family History:    No family history on file.    Social History:  Marital Status:   [2]  Social History   Substance Use Topics     Smoking status: Not on file     Smokeless tobacco: Not on file     Alcohol use Not on file        Medications:      diclofenac (VOLTAREN) 75 MG EC tablet   oxyCODONE IR (ROXICODONE) 5 MG tablet   ATORVASTATIN CALCIUM PO   GLIPIZIDE XL PO   metFORMIN (GLUCOPHAGE-XR) 500 MG 24 hr tablet   nicotine polacrilex (NICOTINE MINI) 2 MG lozenge   POTASSIUM CHLORIDE ER PO   SERTRALINE HCL PO         Review of Systems   All other systems reviewed and are negative.      Physical Exam   BP: 146/87  Heart Rate: 79  Temp: 98.4  F (36.9  C)  Resp: 20  Weight: 81.6 kg (180 lb)  SpO2: 98  %      Physical Exam   Constitutional: She is oriented to person, place, and time. She appears well-developed and well-nourished. She appears distressed.   Does not appear ill or toxic.  She appears uncomfortable due to pain   Abdominal: Soft. Bowel sounds are normal.   Musculoskeletal:        Back:    Neurological: She is alert and oriented to person, place, and time. She has normal reflexes.   Skin: Skin is warm and dry.   Nursing note and vitals reviewed.      ED Course     ED Course     Procedures             Results for orders placed or performed during the hospital encounter of 07/11/18   CT Lumbar Spine w/o Contrast    Narrative    CT LUMBAR SPINE W/O CONTRAST  7/11/2018 5:28 AM      HISTORY: Low back pain.    TECHNIQUE: Multiplanar imaging of the lumbar spine without intravenous  contrast. Radiation dose for this scan was reduced using automated  exposure control, adjustment of the mA and/or kV according to patient  size, or iterative reconstruction technique.     COMPARISON: None.    FINDINGS: There is no acute fracture or malalignment. There is mild  disc height loss at L3-L4. Mild left paracentral disc bulge. There is  mild diffuse disc bulge at L4-L5. There is mild spinal stenosis. There  is no significant foraminal stenosis. There is degenerative facet  sclerosis from L4-S1. The paraspinal soft tissues are unremarkable.      Impression    IMPRESSION: No acute abnormality. Mild multilevel degenerative  disease.    STACY ALVAREZ MD         Critical Care time:  none               No results found for this or any previous visit (from the past 24 hour(s)).    Medications   HYDROmorphone (DILAUDID) injection 1 mg (1 mg Intravenous Given 7/11/18 0450)   ondansetron (ZOFRAN) injection 4 mg (4 mg Intravenous Given 7/11/18 0450)   0.9% sodium chloride BOLUS (0 mLs Intravenous Stopped 7/11/18 0633)       Assessments & Plan (with Medical Decision Making)   53-year-old female past medical history reviewed as above  who presents with concerns of acute worsening of approximately 6 months of low back pain as described in the HPI.  There were no red flags historically and none found on exam.  Given the chronicity and the recent acuity of symptoms imaging was obtained and is reviewed as above noting no acute abnormality.  The patient was treated in the emergency department for her pain successfully to her satisfaction and will be disposition to home with the plan for physical therapy referral which I have placed as well as continued NSAID therapy as baseline with both parents 75 mg twice daily with food, she may use oxycodone for breakthrough sparingly.  She should follow-up in clinic with her primary care provider in the next 1-2 weeks.  If she is not improving further evaluation imaging is indicated.      Disclaimer: This note consists of symbols derived from keyboarding, dictation and/or voice recognition software. As a result, there may be errors in the script that have gone undetected. Please consider this when interpreting information found in this chart.    I have reviewed the nursing notes.    I have reviewed the findings, diagnosis, plan and need for follow up with the patient.       Discharge Medication List as of 7/11/2018  6:57 AM      START taking these medications    Details   diclofenac (VOLTAREN) 75 MG EC tablet Take 1 tablet (75 mg) by mouth 2 times daily as needed for moderate pain, Disp-30 tablet, R-0, Local Print      oxyCODONE IR (ROXICODONE) 5 MG tablet Take 1 tablet (5 mg) by mouth every 6 hours as needed for pain, Disp-12 tablet, R-0, Local Print             Final diagnoses:   Acute exacerbation of chronic low back pain       7/11/2018   [facility] EMERGENCY DEPARTMENT     Jovani Doyle MD  07/17/18 4935

## 2018-07-11 NOTE — ED NOTES
PT is a 53 year old female who presents to the ED with complaints of right lower back which radiates to the right leg. PT placed in room 12, into a gown, on the gurney and on the monitor. PT states back in January she began having back pain for an unknown reason. States since the pain has been coming and going and tonight it is the worse it has been. PT states this episode the pain began yesterday afternoon while at work. States last night she took 3 tylenol and it has not helped the pain. PT is noted to be A&OX4, appear to be in pain with discomfort. All needs are beige assessed and will be met and all comfort measures are being addressed. Awaiting MD parr and orders at this time.

## 2018-07-11 NOTE — ED AVS SNAPSHOT
[facility] Emergency Department    [address] [address]    Phone:  [phone]    Fax:  [phone]                                       Deyanira Kelley   MRN: [id number]    Department:  [facility] Emergency Department   Date of Visit:  7/11/2018           After Visit Summary Signature Page     I have received my discharge instructions, and my questions have been answered. I have discussed any challenges I see with this plan with the nurse or doctor.    ..........................................................................................................................................  Patient/Patient Representative Signature      ..........................................................................................................................................  Patient Representative Print Name and Relationship to Patient    ..................................................               ................................................  Date                                            Time    ..........................................................................................................................................  Reviewed by Signature/Title    ...................................................              ..............................................  Date                                                            Time

## 2018-07-11 NOTE — ED AVS SNAPSHOT
[facility] Emergency Department    [address] [address]    Phone:  [phone]    Fax:  [phone]                                       Deyanira Kelley   MRN: [id number]    Department:  [facility] Emergency Department   Date of Visit:  7/11/2018           Patient Information     Date Of Birth          1965        Your diagnoses for this visit were:     Acute exacerbation of chronic low back pain        You were seen by Jovani Doyle MD.      Follow-up Information     Schedule an appointment as soon as possible for a visit with Israel Pinedo [id number].    Why:  As needed, If symptoms worsen    Contact information:    [address]  [address]  [place] [address]          Discharge Instructions       Back rest times 48 hours.  After the initial 48 hours 20-30 minutes walking on a flat level surface daily.  No lifting or straining.  Call for an appointment with physical therapy in approximately 1 week's time.  I would recommend that you use a medication like diclofenac 75 mg twice daily as baseline pain medication and use the oxycodone prescribed for breakthrough pain only.  Please follow-up in clinic with your primary care provider in the next 2-3 weeks for review.  Return to the emergency department if worse or changes.    24 Hour Appointment Hotline       To make an appointment at any [place] clinic, call [phone] ([phone]). If you don't have a family doctor or clinic, we will help you find one. [place] clinics are conveniently located to serve the needs of you and your family.          ED Discharge Orders     PHYSICAL THERAPY REFERRAL       *This therapy referral will be filtered to a centralized scheduling office at [facility] and the patient will receive a call to schedule an appointment at a [place] location most convenient for them. *     [facility] provides Physical Therapy evaluation and treatment and  "many specialty services across the [place] system.  If requesting a specialty program, please choose from the list below.    If you have not heard from the scheduling office within 2 business days, please call [phone] for all locations, with the exception of [place], please call [phone] and [place], please call [phone]  Treatment: Evaluation & Treatment  Special Instructions/Modalities:   Special Programs: None    Please be aware that coverage of these services is subject to the terms and limitations of your health insurance plan.  Call member services at your health plan with any benefit or coverage questions.      **Note to Provider:  If you are referring outside of [place] for the therapy appointment, please list the name of the location in the \"special instructions\" above, print the referral and give to the patient to schedule the appointment.                     Review of your medicines      START taking        Dose / Directions Last dose taken    diclofenac 75 MG EC tablet   Commonly known as:  VOLTAREN   Dose:  75 mg   Quantity:  30 tablet        Take 1 tablet (75 mg) by mouth 2 times daily as needed for moderate pain   Refills:  0        oxyCODONE IR 5 MG tablet   Commonly known as:  ROXICODONE   Dose:  5 mg   Quantity:  12 tablet        Take 1 tablet (5 mg) by mouth every 6 hours as needed for pain   Refills:  0          Our records show that you are taking the medicines listed below. If these are incorrect, please call your family doctor or clinic.        Dose / Directions Last dose taken    ATORVASTATIN CALCIUM PO   Dose:  20 mg        Take 20 mg by mouth daily   Refills:  0        GLIPIZIDE XL PO   Dose:  15 mg        Take 15 mg by mouth daily (with breakfast)   Refills:  0        metFORMIN 500 MG 24 hr tablet   Commonly known as:  GLUCOPHAGE-XR   Dose:  2000 mg        Take 2,000 mg by mouth daily (with breakfast)   Refills:  0        NICOTINE MINI 2 MG lozenge   Dose:  2 mg   Generic " drug:  nicotine polacrilex        Place 2 mg inside cheek every hour as needed for smoking cessation   Refills:  0        POTASSIUM CHLORIDE ER PO   Dose:  10 mEq        Take 10 mEq by mouth daily   Refills:  0        SERTRALINE HCL PO   Dose:  100 mg        Take 100 mg by mouth daily   Refills:  0                Information about OPIOIDS     PRESCRIPTION OPIOIDS: WHAT YOU NEED TO KNOW   We gave you an opioid (narcotic) pain medicine. It is important to manage your pain, but opioids are not always the best choice. You should first try all the other options your care team gave you. Take this medicine for as short a time (and as few doses) as possible.     These medicines have risks:    DO NOT drive when on new or higher doses of pain medicine. These medicines can affect your alertness and reaction times, and you could be arrested for driving under the influence (DUI). If you need to use opioids long-term, talk to your care team about driving.    DO NOT operate heave machinery    DO NOT do any other dangerous activities while taking these medicines.     DO NOT drink any alcohol while taking these medicines.      If the opioid prescribed includes acetaminophen, DO NOT take with any other medicines that contain acetaminophen. Read all labels carefully. Look for the word  acetaminophen  or  Tylenol.  Ask your pharmacist if you have questions or are unsure.    You can get addicted to pain medicines, especially if you have a history of addiction (chemical, alcohol or substance dependence). Talk to your care team about ways to reduce this risk.    Store your pills in a secure place, locked if possible. We will not replace any lost or stolen medicine. If you don t finish your medicine, please throw away (dispose) as directed by your pharmacist. The [place] [employer]ution [employer] has more information about safe disposal: https://www.pca.state.[place]/living-green/managing-unwanted-medications.     All opioids tend to cause  constipation. Drink plenty of water and eat foods that have a lot of fiber, such as fruits, vegetables, prune juice, apple juice and high-fiber cereal. Take a laxative (Miralax, milk of magnesia, Colace, Senna) if you don t move your bowels at least every other day.         Prescriptions were sent or printed at these locations (2 Prescriptions)                   Other Prescriptions                Printed at Department/Unit printer (2 of 2)         diclofenac (VOLTAREN) 75 MG EC tablet               oxyCODONE IR (ROXICODONE) 5 MG tablet                Procedures and tests performed during your visit     CT Lumbar Spine w/o Contrast      Orders Needing Specimen Collection     None      Pending Results     No orders found from 7/9/2018 to 7/12/2018.            Pending Culture Results     No orders found from 7/9/2018 to 7/12/2018.            Pending Results Instructions     If you had any lab results that were not finalized at the time of your Discharge, you can call the ED Lab Result RN at [phone]. You will be contacted by this team for any positive Lab results or changes in treatment. The nurses are available 7 days a week from 10A to 6:30P.  You can leave a message 24 hours per day and they will return your call.        Test Results From Your Hospital Stay        7/11/2018  6:27 AM      Narrative     CT LUMBAR SPINE W/O CONTRAST  7/11/2018 5:28 AM      HISTORY: Low back pain.    TECHNIQUE: Multiplanar imaging of the lumbar spine without intravenous  contrast. Radiation dose for this scan was reduced using automated  exposure control, adjustment of the mA and/or kV according to patient  size, or iterative reconstruction technique.     COMPARISON: None.    FINDINGS: There is no acute fracture or malalignment. There is mild  disc height loss at L3-L4. Mild left paracentral disc bulge. There is  mild diffuse disc bulge at L4-L5. There is mild spinal stenosis. There  is no significant foraminal stenosis. There is  "degenerative facet  sclerosis from L4-S1. The paraspinal soft tissues are unremarkable.        Impression     IMPRESSION: No acute abnormality. Mild multilevel degenerative  disease.    STACY ALVAREZ MD                Thank you for choosing [facility]       Thank you for choosing [facility] for your care. Our goal is always to provide you with excellent care. Hearing back from our patients is one way we can continue to improve our services. Please take a few minutes to complete the written survey that you may receive in the mail after you visit with us. Thank you!        [employer] Information     [employer] lets you send messages to your doctor, view your test results, renew your prescriptions, schedule appointments and more. To sign up, go to [email or web address] . Click on \"Log in\" on the left side of the screen, which will take you to the Welcome page. Then click on \"Sign up Now\" on the right side of the page.     You will be asked to enter the access code listed below, as well as some personal information. Please follow the directions to create your username and password.     Your access code is: [email or web address]  Expires: 10/9/2018  6:32 AM     Your access code will  in 90 days. If you need help or a new code, please call your [facility] clinic or [phone].        Care EveryWhere ID     This is your Care EveryWhere ID. This could be used by other organizations to access your [facility] medical records  [id number]        Equal Access to Services     [facility] ANTOINE : Hadii tammy Nevarez, wajaymieda itzel, qaybta angelikaalreese ann . So [facility] [phone].    ATENCIÓN: Si habla español, tiene a eden disposición servicios gratuitos de asistencia lingüística. Denise al [phone].    We comply with applicable federal civil rights laws and [place] laws. We do not discriminate on the basis of race, color, national origin, age, disability, sex, sexual orientation, or " gender identity.            After Visit Summary       This is your record. Keep this with you and show to your community pharmacist(s) and doctor(s) at your next visit.

## 2018-07-11 NOTE — DISCHARGE INSTRUCTIONS
Back rest times 48 hours.  After the initial 48 hours 20-30 minutes walking on a flat level surface daily.  No lifting or straining.  Call for an appointment with physical therapy in approximately 1 week's time.  I would recommend that you use a medication like diclofenac 75 mg twice daily as baseline pain medication and use the oxycodone prescribed for breakthrough pain only.  Please follow-up in clinic with your primary care provider in the next 2-3 weeks for review.  Return to the emergency department if worse or changes.

## 2018-07-12 ENCOUNTER — OFFICE VISIT - HEALTHEAST (OUTPATIENT)
Dept: FAMILY MEDICINE | Facility: CLINIC | Age: 53
End: 2018-07-12

## 2018-07-12 ENCOUNTER — HOSPITAL ENCOUNTER (OUTPATIENT)
Dept: PHYSICAL MEDICINE AND REHAB | Facility: CLINIC | Age: 53
Discharge: HOME | End: 2018-07-12
Attending: NURSE PRACTITIONER

## 2018-07-12 DIAGNOSIS — M54.50 RIGHT-SIDED LOW BACK PAIN WITHOUT SCIATICA: ICD-10-CM

## 2018-07-12 DIAGNOSIS — E87.6 HYPOKALEMIA: ICD-10-CM

## 2018-07-12 DIAGNOSIS — M47.816 LUMBAR FACET ARTHROPATHY: ICD-10-CM

## 2018-07-12 DIAGNOSIS — Z12.31 VISIT FOR SCREENING MAMMOGRAM: ICD-10-CM

## 2018-07-12 DIAGNOSIS — M54.41 ACUTE RIGHT-SIDED LOW BACK PAIN WITH RIGHT-SIDED SCIATICA: ICD-10-CM

## 2018-07-12 LAB
ANION GAP SERPL CALCULATED.3IONS-SCNC: 9 MMOL/L (ref 5–18)
BUN SERPL-MCNC: 13 MG/DL (ref 8–22)
CALCIUM SERPL-MCNC: 8.9 MG/DL (ref 8.5–10.5)
CHLORIDE BLD-SCNC: 106 MMOL/L (ref 98–107)
CO2 SERPL-SCNC: 26 MMOL/L (ref 22–31)
CREAT SERPL-MCNC: 0.81 MG/DL (ref 0.6–1.1)
GFR SERPL CREATININE-BSD FRML MDRD: >60 ML/MIN/1.73M2
GLUCOSE BLD-MCNC: 199 MG/DL (ref 70–125)
MAGNESIUM SERPL-MCNC: 2.4 MG/DL (ref 1.8–2.6)
POTASSIUM BLD-SCNC: 4.3 MMOL/L (ref 3.5–5)
SODIUM SERPL-SCNC: 141 MMOL/L (ref 136–145)

## 2018-07-12 ASSESSMENT — MIFFLIN-ST. JEOR: SCORE: 1432.95

## 2018-07-16 ENCOUNTER — RECORDS - HEALTHEAST (OUTPATIENT)
Dept: ADMINISTRATIVE | Facility: OTHER | Age: 53
End: 2018-07-16

## 2018-07-17 ENCOUNTER — RECORDS - HEALTHEAST (OUTPATIENT)
Dept: ADMINISTRATIVE | Facility: OTHER | Age: 53
End: 2018-07-17

## 2018-07-18 ENCOUNTER — HOSPITAL ENCOUNTER (OUTPATIENT)
Dept: PHYSICAL MEDICINE AND REHAB | Facility: CLINIC | Age: 53
Discharge: HOME | End: 2018-07-18
Attending: PHYSICIAN ASSISTANT

## 2018-07-18 ENCOUNTER — COMMUNICATION - HEALTHEAST (OUTPATIENT)
Dept: FAMILY MEDICINE | Facility: CLINIC | Age: 53
End: 2018-07-18

## 2018-07-18 DIAGNOSIS — M79.18 MYOFASCIAL PAIN: ICD-10-CM

## 2018-07-18 DIAGNOSIS — M47.817 FACET ARTHROPATHY, LUMBOSACRAL: ICD-10-CM

## 2018-07-18 DIAGNOSIS — M54.16 LUMBAR RADICULITIS: ICD-10-CM

## 2018-07-18 DIAGNOSIS — M51.26 LUMBAR DISC HERNIATION: ICD-10-CM

## 2018-07-18 DIAGNOSIS — M54.50 LUMBALGIA: ICD-10-CM

## 2018-07-18 ASSESSMENT — MIFFLIN-ST. JEOR: SCORE: 1430.96

## 2018-07-19 ENCOUNTER — OFFICE VISIT - HEALTHEAST (OUTPATIENT)
Dept: PHYSICAL THERAPY | Facility: REHABILITATION | Age: 53
End: 2018-07-19

## 2018-07-19 DIAGNOSIS — M54.16 RIGHT LUMBAR RADICULITIS: ICD-10-CM

## 2018-07-19 DIAGNOSIS — M53.3 SI (SACROILIAC) JOINT DYSFUNCTION: ICD-10-CM

## 2018-07-19 DIAGNOSIS — F41.9 ANXIETY: ICD-10-CM

## 2018-07-19 DIAGNOSIS — M62.81 MUSCLE WEAKNESS (GENERALIZED): ICD-10-CM

## 2018-07-23 ENCOUNTER — AMBULATORY - HEALTHEAST (OUTPATIENT)
Dept: PHYSICAL MEDICINE AND REHAB | Facility: CLINIC | Age: 53
End: 2018-07-23

## 2018-07-25 ENCOUNTER — OFFICE VISIT - HEALTHEAST (OUTPATIENT)
Dept: PHYSICAL THERAPY | Facility: REHABILITATION | Age: 53
End: 2018-07-25

## 2018-07-25 DIAGNOSIS — M54.16 RIGHT LUMBAR RADICULITIS: ICD-10-CM

## 2018-07-25 DIAGNOSIS — M62.81 MUSCLE WEAKNESS (GENERALIZED): ICD-10-CM

## 2018-07-25 DIAGNOSIS — F41.9 ANXIETY: ICD-10-CM

## 2018-07-25 DIAGNOSIS — M53.3 SI (SACROILIAC) JOINT DYSFUNCTION: ICD-10-CM

## 2018-07-27 ENCOUNTER — OFFICE VISIT - HEALTHEAST (OUTPATIENT)
Dept: PHYSICAL THERAPY | Facility: REHABILITATION | Age: 53
End: 2018-07-27

## 2018-07-27 DIAGNOSIS — M53.3 SI (SACROILIAC) JOINT DYSFUNCTION: ICD-10-CM

## 2018-07-27 DIAGNOSIS — M54.16 RIGHT LUMBAR RADICULITIS: ICD-10-CM

## 2018-07-27 DIAGNOSIS — M62.81 MUSCLE WEAKNESS (GENERALIZED): ICD-10-CM

## 2018-07-27 DIAGNOSIS — F41.9 ANXIETY: ICD-10-CM

## 2018-07-30 ENCOUNTER — OFFICE VISIT - HEALTHEAST (OUTPATIENT)
Dept: PHYSICAL THERAPY | Facility: REHABILITATION | Age: 53
End: 2018-07-30

## 2018-07-30 DIAGNOSIS — M62.81 MUSCLE WEAKNESS (GENERALIZED): ICD-10-CM

## 2018-07-30 DIAGNOSIS — M53.3 SI (SACROILIAC) JOINT DYSFUNCTION: ICD-10-CM

## 2018-07-30 DIAGNOSIS — M54.16 RIGHT LUMBAR RADICULITIS: ICD-10-CM

## 2018-07-31 ENCOUNTER — RECORDS - HEALTHEAST (OUTPATIENT)
Dept: RADIOLOGY | Facility: CLINIC | Age: 53
End: 2018-07-31

## 2018-08-02 ENCOUNTER — OFFICE VISIT - HEALTHEAST (OUTPATIENT)
Dept: PHYSICAL THERAPY | Facility: REHABILITATION | Age: 53
End: 2018-08-02

## 2018-08-02 DIAGNOSIS — M54.16 RIGHT LUMBAR RADICULITIS: ICD-10-CM

## 2018-08-02 DIAGNOSIS — M62.81 MUSCLE WEAKNESS (GENERALIZED): ICD-10-CM

## 2018-08-02 DIAGNOSIS — M53.3 SI (SACROILIAC) JOINT DYSFUNCTION: ICD-10-CM

## 2018-08-09 ENCOUNTER — OFFICE VISIT - HEALTHEAST (OUTPATIENT)
Dept: PHYSICAL THERAPY | Facility: REHABILITATION | Age: 53
End: 2018-08-09

## 2018-08-09 DIAGNOSIS — M62.81 MUSCLE WEAKNESS (GENERALIZED): ICD-10-CM

## 2018-08-09 DIAGNOSIS — M53.3 SI (SACROILIAC) JOINT DYSFUNCTION: ICD-10-CM

## 2018-08-09 DIAGNOSIS — F41.9 ANXIETY: ICD-10-CM

## 2018-08-09 DIAGNOSIS — M54.16 RIGHT LUMBAR RADICULITIS: ICD-10-CM

## 2018-08-15 ENCOUNTER — HOSPITAL ENCOUNTER (OUTPATIENT)
Dept: PHYSICAL MEDICINE AND REHAB | Facility: CLINIC | Age: 53
Discharge: HOME | End: 2018-08-15
Attending: NURSE PRACTITIONER

## 2018-08-15 DIAGNOSIS — M54.41 ACUTE RIGHT-SIDED LOW BACK PAIN WITH RIGHT-SIDED SCIATICA: ICD-10-CM

## 2018-08-15 DIAGNOSIS — M53.3 SI (SACROILIAC) JOINT DYSFUNCTION: ICD-10-CM

## 2018-08-15 DIAGNOSIS — M47.817 FACET ARTHROPATHY, LUMBOSACRAL: ICD-10-CM

## 2018-08-17 ENCOUNTER — OFFICE VISIT - HEALTHEAST (OUTPATIENT)
Dept: PHYSICAL THERAPY | Facility: REHABILITATION | Age: 53
End: 2018-08-17

## 2018-08-17 DIAGNOSIS — M53.3 SI (SACROILIAC) JOINT DYSFUNCTION: ICD-10-CM

## 2018-08-17 DIAGNOSIS — M62.81 MUSCLE WEAKNESS (GENERALIZED): ICD-10-CM

## 2018-08-17 DIAGNOSIS — F41.9 ANXIETY: ICD-10-CM

## 2018-08-17 DIAGNOSIS — M54.16 RIGHT LUMBAR RADICULITIS: ICD-10-CM

## 2018-08-30 ENCOUNTER — COMMUNICATION - HEALTHEAST (OUTPATIENT)
Dept: FAMILY MEDICINE | Facility: CLINIC | Age: 53
End: 2018-08-30

## 2018-08-31 ENCOUNTER — OFFICE VISIT - HEALTHEAST (OUTPATIENT)
Dept: PHYSICAL THERAPY | Facility: REHABILITATION | Age: 53
End: 2018-08-31

## 2018-08-31 DIAGNOSIS — M54.16 RIGHT LUMBAR RADICULITIS: ICD-10-CM

## 2018-08-31 DIAGNOSIS — M62.81 MUSCLE WEAKNESS (GENERALIZED): ICD-10-CM

## 2018-08-31 DIAGNOSIS — M53.3 SI (SACROILIAC) JOINT DYSFUNCTION: ICD-10-CM

## 2018-08-31 DIAGNOSIS — F41.9 ANXIETY: ICD-10-CM

## 2018-09-14 ENCOUNTER — OFFICE VISIT - HEALTHEAST (OUTPATIENT)
Dept: PHYSICAL THERAPY | Facility: REHABILITATION | Age: 53
End: 2018-09-14

## 2018-09-14 DIAGNOSIS — M62.81 MUSCLE WEAKNESS (GENERALIZED): ICD-10-CM

## 2018-09-14 DIAGNOSIS — F41.9 ANXIETY: ICD-10-CM

## 2018-09-14 DIAGNOSIS — M54.16 RIGHT LUMBAR RADICULITIS: ICD-10-CM

## 2018-09-14 DIAGNOSIS — M53.3 SI (SACROILIAC) JOINT DYSFUNCTION: ICD-10-CM

## 2018-10-17 ENCOUNTER — COMMUNICATION - HEALTHEAST (OUTPATIENT)
Dept: FAMILY MEDICINE | Facility: CLINIC | Age: 53
End: 2018-10-17

## 2018-11-01 ENCOUNTER — OFFICE VISIT - HEALTHEAST (OUTPATIENT)
Dept: FAMILY MEDICINE | Facility: CLINIC | Age: 53
End: 2018-11-01

## 2018-11-01 DIAGNOSIS — R25.2 LEG CRAMPS: ICD-10-CM

## 2018-11-01 DIAGNOSIS — R19.7 DIARRHEA: ICD-10-CM

## 2018-11-01 DIAGNOSIS — K52.832 LYMPHOCYTIC COLITIS: ICD-10-CM

## 2018-11-01 DIAGNOSIS — N95.1 MENOPAUSAL SYNDROME (HOT FLASHES): ICD-10-CM

## 2018-11-01 DIAGNOSIS — F41.9 ANXIETY: ICD-10-CM

## 2018-11-01 DIAGNOSIS — E11.65 UNCONTROLLED TYPE 2 DIABETES MELLITUS WITH HYPERGLYCEMIA (H): ICD-10-CM

## 2018-11-01 DIAGNOSIS — B07.8 COMMON WART: ICD-10-CM

## 2018-11-01 LAB
ANION GAP SERPL CALCULATED.3IONS-SCNC: 12 MMOL/L (ref 5–18)
BUN SERPL-MCNC: 12 MG/DL (ref 8–22)
CALCIUM SERPL-MCNC: 9.4 MG/DL (ref 8.5–10.5)
CHLORIDE BLD-SCNC: 105 MMOL/L (ref 98–107)
CO2 SERPL-SCNC: 24 MMOL/L (ref 22–31)
CREAT SERPL-MCNC: 0.86 MG/DL (ref 0.6–1.1)
FSH SERPL-ACNC: 61.2 MIU/ML
GFR SERPL CREATININE-BSD FRML MDRD: >60 ML/MIN/1.73M2
GLUCOSE BLD-MCNC: 230 MG/DL (ref 70–125)
MAGNESIUM SERPL-MCNC: 2.4 MG/DL (ref 1.8–2.6)
POTASSIUM BLD-SCNC: 3.3 MMOL/L (ref 3.5–5)
SODIUM SERPL-SCNC: 141 MMOL/L (ref 136–145)
TSH SERPL DL<=0.005 MIU/L-ACNC: 1.77 UIU/ML (ref 0.3–5)

## 2018-11-01 ASSESSMENT — MIFFLIN-ST. JEOR: SCORE: 1430.96

## 2019-01-16 ENCOUNTER — RECORDS - HEALTHEAST (OUTPATIENT)
Dept: ADMINISTRATIVE | Facility: OTHER | Age: 54
End: 2019-01-16

## 2019-01-16 LAB — HBA1C MFR BLD: 8.3 % (ref 0–5.6)

## 2019-03-04 ENCOUNTER — AMBULATORY - HEALTHEAST (OUTPATIENT)
Dept: FAMILY MEDICINE | Facility: CLINIC | Age: 54
End: 2019-03-04

## 2019-04-02 ENCOUNTER — COMMUNICATION - HEALTHEAST (OUTPATIENT)
Dept: FAMILY MEDICINE | Facility: CLINIC | Age: 54
End: 2019-04-02

## 2019-04-02 DIAGNOSIS — F33.9 EPISODE OF RECURRENT MAJOR DEPRESSIVE DISORDER, UNSPECIFIED DEPRESSION EPISODE SEVERITY (H): ICD-10-CM

## 2019-04-02 DIAGNOSIS — F41.9 ANXIETY: ICD-10-CM

## 2019-04-17 ENCOUNTER — AMBULATORY - HEALTHEAST (OUTPATIENT)
Dept: MULTI SPECIALTY CLINIC | Facility: CLINIC | Age: 54
End: 2019-04-17

## 2019-04-17 LAB — HBA1C MFR BLD: 9.3 % (ref 0–5.6)

## 2019-07-18 ENCOUNTER — AMBULATORY - HEALTHEAST (OUTPATIENT)
Dept: MULTI SPECIALTY CLINIC | Facility: CLINIC | Age: 54
End: 2019-07-18

## 2019-07-18 LAB — HBA1C MFR BLD: 7.5 % (ref 0–5.6)

## 2019-10-22 ENCOUNTER — AMBULATORY - HEALTHEAST (OUTPATIENT)
Dept: MULTI SPECIALTY CLINIC | Facility: CLINIC | Age: 54
End: 2019-10-22

## 2019-10-22 LAB
CHOLEST SERPL-MCNC: 214 MG/DL (ref 100–199)
HDLC SERPL-MCNC: 52 MG/DL
LDLC SERPL CALC-MCNC: 124 MG/DL
NON HDL CHOL. (LDL+VLDL): 162
TRIGLYCERIDES (HISTORICAL CONVERSION): 192

## 2020-01-28 ENCOUNTER — COMMUNICATION - HEALTHEAST (OUTPATIENT)
Dept: FAMILY MEDICINE | Facility: CLINIC | Age: 55
End: 2020-01-28

## 2020-01-28 DIAGNOSIS — F41.9 ANXIETY: ICD-10-CM

## 2020-01-29 ENCOUNTER — AMBULATORY - HEALTHEAST (OUTPATIENT)
Dept: MULTI SPECIALTY CLINIC | Facility: CLINIC | Age: 55
End: 2020-01-29

## 2020-01-29 LAB — HBA1C MFR BLD: 7.6 % (ref 0–5.6)

## 2020-03-03 ENCOUNTER — OFFICE VISIT - HEALTHEAST (OUTPATIENT)
Dept: FAMILY MEDICINE | Facility: CLINIC | Age: 55
End: 2020-03-03

## 2020-03-03 ENCOUNTER — COMMUNICATION - HEALTHEAST (OUTPATIENT)
Dept: SCHEDULING | Facility: CLINIC | Age: 55
End: 2020-03-03

## 2020-03-03 DIAGNOSIS — Z12.31 VISIT FOR SCREENING MAMMOGRAM: ICD-10-CM

## 2020-03-03 DIAGNOSIS — J01.90 ACUTE SINUSITIS WITH SYMPTOMS > 10 DAYS: ICD-10-CM

## 2020-03-03 ASSESSMENT — MIFFLIN-ST. JEOR: SCORE: 1463.85

## 2020-03-11 ENCOUNTER — COMMUNICATION - HEALTHEAST (OUTPATIENT)
Dept: SCHEDULING | Facility: CLINIC | Age: 55
End: 2020-03-11

## 2020-03-13 ENCOUNTER — COMMUNICATION - HEALTHEAST (OUTPATIENT)
Dept: FAMILY MEDICINE | Facility: CLINIC | Age: 55
End: 2020-03-13

## 2020-03-13 DIAGNOSIS — B37.9 ANTIBIOTIC-INDUCED YEAST INFECTION: ICD-10-CM

## 2020-03-13 DIAGNOSIS — T36.95XA ANTIBIOTIC-INDUCED YEAST INFECTION: ICD-10-CM

## 2020-03-30 ENCOUNTER — COMMUNICATION - HEALTHEAST (OUTPATIENT)
Dept: FAMILY MEDICINE | Facility: CLINIC | Age: 55
End: 2020-03-30

## 2020-03-30 DIAGNOSIS — F41.9 ANXIETY: ICD-10-CM

## 2020-05-06 ENCOUNTER — HOSPITAL ENCOUNTER (OUTPATIENT)
Dept: MAMMOGRAPHY | Facility: CLINIC | Age: 55
Discharge: HOME | End: 2020-05-06
Attending: FAMILY MEDICINE

## 2020-05-06 DIAGNOSIS — Z12.31 VISIT FOR SCREENING MAMMOGRAM: ICD-10-CM

## 2020-05-27 ENCOUNTER — COMMUNICATION - HEALTHEAST (OUTPATIENT)
Dept: FAMILY MEDICINE | Facility: CLINIC | Age: 55
End: 2020-05-27

## 2020-05-27 DIAGNOSIS — F41.9 ANXIETY: ICD-10-CM

## 2020-07-31 ENCOUNTER — OFFICE VISIT - HEALTHEAST (OUTPATIENT)
Dept: FAMILY MEDICINE | Facility: CLINIC | Age: 55
End: 2020-07-31

## 2020-07-31 DIAGNOSIS — E11.65 UNCONTROLLED TYPE 2 DIABETES MELLITUS WITH HYPERGLYCEMIA (H): ICD-10-CM

## 2020-07-31 DIAGNOSIS — F41.9 ANXIETY: ICD-10-CM

## 2020-07-31 DIAGNOSIS — F32.1 CURRENT MODERATE EPISODE OF MAJOR DEPRESSIVE DISORDER WITHOUT PRIOR EPISODE (H): ICD-10-CM

## 2020-07-31 ASSESSMENT — ANXIETY QUESTIONNAIRES
7. FEELING AFRAID AS IF SOMETHING AWFUL MIGHT HAPPEN: NEARLY EVERY DAY
2. NOT BEING ABLE TO STOP OR CONTROL WORRYING: NEARLY EVERY DAY
4. TROUBLE RELAXING: NEARLY EVERY DAY
IF YOU CHECKED OFF ANY PROBLEMS ON THIS QUESTIONNAIRE, HOW DIFFICULT HAVE THESE PROBLEMS MADE IT FOR YOU TO DO YOUR WORK, TAKE CARE OF THINGS AT HOME, OR GET ALONG WITH OTHER PEOPLE: EXTREMELY DIFFICULT
6. BECOMING EASILY ANNOYED OR IRRITABLE: NEARLY EVERY DAY
1. FEELING NERVOUS, ANXIOUS, OR ON EDGE: NEARLY EVERY DAY
5. BEING SO RESTLESS THAT IT IS HARD TO SIT STILL: NEARLY EVERY DAY
3. WORRYING TOO MUCH ABOUT DIFFERENT THINGS: NEARLY EVERY DAY
GAD7 TOTAL SCORE: 21

## 2020-07-31 ASSESSMENT — PATIENT HEALTH QUESTIONNAIRE - PHQ9: SUM OF ALL RESPONSES TO PHQ QUESTIONS 1-9: 15

## 2020-09-24 ENCOUNTER — COMMUNICATION - HEALTHEAST (OUTPATIENT)
Dept: FAMILY MEDICINE | Facility: CLINIC | Age: 55
End: 2020-09-24

## 2020-10-05 ENCOUNTER — OFFICE VISIT - HEALTHEAST (OUTPATIENT)
Dept: FAMILY MEDICINE | Facility: CLINIC | Age: 55
End: 2020-10-05

## 2020-10-05 DIAGNOSIS — F41.9 ANXIETY: ICD-10-CM

## 2020-10-05 DIAGNOSIS — K52.832 LYMPHOCYTIC COLITIS: ICD-10-CM

## 2021-02-13 ENCOUNTER — COMMUNICATION - HEALTHEAST (OUTPATIENT)
Dept: FAMILY MEDICINE | Facility: CLINIC | Age: 56
End: 2021-02-13

## 2021-03-24 ENCOUNTER — AMBULATORY - HEALTHEAST (OUTPATIENT)
Dept: NURSING | Facility: CLINIC | Age: 56
End: 2021-03-24

## 2021-04-14 ENCOUNTER — AMBULATORY - HEALTHEAST (OUTPATIENT)
Dept: NURSING | Facility: CLINIC | Age: 56
End: 2021-04-14

## 2021-05-27 ENCOUNTER — RECORDS - HEALTHEAST (OUTPATIENT)
Dept: ADMINISTRATIVE | Facility: CLINIC | Age: 56
End: 2021-05-27

## 2021-05-27 ASSESSMENT — PATIENT HEALTH QUESTIONNAIRE - PHQ9: SUM OF ALL RESPONSES TO PHQ QUESTIONS 1-9: 15

## 2021-05-27 NOTE — TELEPHONE ENCOUNTER
Refill Request  Did you contact pharmacy: No  Medication name:   Requested Prescriptions     Pending Prescriptions Disp Refills     venlafaxine (EFFEXOR XR) 37.5 MG 24 hr capsule 20 capsule 0     Sig: Take two capsules PO Qday     Who prescribed the medication: pcp  Pharmacy Name and Location: [facility] [place]  Is patient out of medication: Yes, patient requesting this emergency prescription as she is leaving for [place] on Thursday 4/4  Patient notified refills processed in 72 hours:  yes  Okay to leave a detailed message: yes

## 2021-05-27 NOTE — TELEPHONE ENCOUNTER
I sent the emergency prescription. Please see other message as well.   Launch [employer] and print the 'Prescriptions from this Visit' Report

## 2021-05-27 NOTE — TELEPHONE ENCOUNTER
Spoke to pt, she is taking 2 of the 37.5mg capsules. She would be fine with you sending a 75mg capsule.  Rx queued for MD approval.

## 2021-05-27 NOTE — TELEPHONE ENCOUNTER
RN cannot approve Refill Request    RN can NOT refill this medication PCP messaged that patient is overdue for Labs.       [facility] Phelps, [facility] Triage/Med Refill 4/2/2019    Requested Prescriptions   Pending Prescriptions Disp Refills     venlafaxine (EFFEXOR XR) 37.5 MG 24 hr capsule 180 capsule 0     Sig: Take two capsules PO Qday    Venlafaxine/Desvenlafaxine Refill Protocol Failed - 4/2/2019  8:59 AM       Failed - LFT or AST or ALT in last year    Albumin   Date Value Ref Range Status   12/14/2017 3.6 3.5 - 5.0 g/dL Final     Bilirubin, Total   Date Value Ref Range Status   12/14/2017 0.5 0.0 - 1.0 mg/dL Final     Bilirubin, Direct   Date Value Ref Range Status   08/28/2017 0.2 <=0.5 mg/dL Final     Alkaline Phosphatase   Date Value Ref Range Status   12/14/2017 63 45 - 120 U/L Final     AST   Date Value Ref Range Status   12/14/2017 13 0 - 40 U/L Final     ALT   Date Value Ref Range Status   12/14/2017 14 0 - 45 U/L Final     Protein, Total   Date Value Ref Range Status   12/14/2017 6.7 6.0 - 8.0 g/dL Final               Failed - Fasting lipid cascade in last year    Cholesterol   Date Value Ref Range Status   05/29/2015 143 <=199 mg/dL Final     Triglycerides   Date Value Ref Range Status   05/29/2015 128 <=149 mg/dL Final     HDL Cholesterol   Date Value Ref Range Status   05/29/2015 46 >=40 mg/dL Final     LDL Calculated   Date Value Ref Range Status   05/29/2015 71 0 - 129 mg/dL Final     Patient Fasting > 8hrs?   Date Value Ref Range Status   05/29/2015 Yes  Final            Passed - PCP or prescribing provider visit in last year    Last office visit with prescriber/PCP: 11/1/2018 Israel Ng MD OR same dept: 11/1/2018 Israel Ng MD OR same specialty: 11/1/2018 Israel Ng MD  Last physical: 5/5/2017 Last MTM visit: Visit date not found   Next visit within 3 mo: Visit date not found  Next physical within 3 mo: Visit date not found  Prescriber OR PCP:  Israel Ng MD  Last diagnosis associated with med order: There are no diagnoses linked to this encounter.  If protocol passes may refill for 12 months if within 3 months of last provider visit (or a total of 15 months).            Passed - Blood Pressure in last year    BP Readings from Last 1 Encounters:   11/01/18 110/64

## 2021-05-27 NOTE — TELEPHONE ENCOUNTER
Please touch base with patient. She was started on Effexor. Can you please check to see how patient is doing. Is the medication helpful? What does is she taking? I can send a refill. If taking two of the 37.5 mg pills I can send 75 mg.

## 2021-05-27 NOTE — TELEPHONE ENCOUNTER
RN cannot approve Refill Request    RN can NOT refill this medication PCP messaged that patient is overdue for Labs.     [facility]etta Phelps, [facility] Triage/Med Refill 4/2/2019    Requested Prescriptions   Pending Prescriptions Disp Refills     venlafaxine (EFFEXOR XR) 37.5 MG 24 hr capsule 20 capsule 0     Sig: Take two capsules PO Qday    Venlafaxine/Desvenlafaxine Refill Protocol Failed - 4/2/2019  9:13 AM       Failed - LFT or AST or ALT in last year    Albumin   Date Value Ref Range Status   12/14/2017 3.6 3.5 - 5.0 g/dL Final     Bilirubin, Total   Date Value Ref Range Status   12/14/2017 0.5 0.0 - 1.0 mg/dL Final     Bilirubin, Direct   Date Value Ref Range Status   08/28/2017 0.2 <=0.5 mg/dL Final     Alkaline Phosphatase   Date Value Ref Range Status   12/14/2017 63 45 - 120 U/L Final     AST   Date Value Ref Range Status   12/14/2017 13 0 - 40 U/L Final     ALT   Date Value Ref Range Status   12/14/2017 14 0 - 45 U/L Final     Protein, Total   Date Value Ref Range Status   12/14/2017 6.7 6.0 - 8.0 g/dL Final               Failed - Fasting lipid cascade in last year    Cholesterol   Date Value Ref Range Status   05/29/2015 143 <=199 mg/dL Final     Triglycerides   Date Value Ref Range Status   05/29/2015 128 <=149 mg/dL Final     HDL Cholesterol   Date Value Ref Range Status   05/29/2015 46 >=40 mg/dL Final     LDL Calculated   Date Value Ref Range Status   05/29/2015 71 0 - 129 mg/dL Final     Patient Fasting > 8hrs?   Date Value Ref Range Status   05/29/2015 Yes  Final            Passed - PCP or prescribing provider visit in last year    Last office visit with prescriber/PCP: 11/1/2018 Israel Ng MD OR same dept: 11/1/2018 Israel Ng MD OR same specialty: 11/1/2018 Israel Ng MD  Last physical: 5/5/2017 Last MTM visit: Visit date not found   Next visit within 3 mo: Visit date not found  Next physical within 3 mo: Visit date not found  Prescriber OR PCP:  Israel Ng MD  Last diagnosis associated with med order: There are no diagnoses linked to this encounter.  If protocol passes may refill for 12 months if within 3 months of last provider visit (or a total of 15 months).            Passed - Blood Pressure in last year    BP Readings from Last 1 Encounters:   11/01/18 110/64

## 2021-05-27 NOTE — TELEPHONE ENCOUNTER
Spoke to pt, notified short supply was sent to [facility] to get her through until mail order supply comes.

## 2021-05-28 ENCOUNTER — RECORDS - HEALTHEAST (OUTPATIENT)
Dept: ADMINISTRATIVE | Facility: CLINIC | Age: 56
End: 2021-05-28

## 2021-05-28 ASSESSMENT — ANXIETY QUESTIONNAIRES: GAD7 TOTAL SCORE: 21

## 2021-05-30 VITALS — WEIGHT: 168 LBS | HEIGHT: 67 IN | BODY MASS INDEX: 26.37 KG/M2

## 2021-05-31 VITALS — HEIGHT: 67 IN | BODY MASS INDEX: 26.84 KG/M2 | WEIGHT: 171 LBS

## 2021-05-31 VITALS — BODY MASS INDEX: 26.84 KG/M2 | WEIGHT: 171 LBS | HEIGHT: 67 IN

## 2021-05-31 VITALS — HEIGHT: 67 IN | WEIGHT: 171 LBS | BODY MASS INDEX: 26.84 KG/M2

## 2021-05-31 VITALS — WEIGHT: 171.8 LBS | BODY MASS INDEX: 27.73 KG/M2

## 2021-05-31 VITALS — BODY MASS INDEX: 26.05 KG/M2 | WEIGHT: 166.3 LBS

## 2021-06-01 VITALS — WEIGHT: 176.44 LBS | BODY MASS INDEX: 27.69 KG/M2 | HEIGHT: 67 IN

## 2021-06-01 VITALS — HEIGHT: 67 IN | WEIGHT: 176 LBS | BODY MASS INDEX: 27.62 KG/M2

## 2021-06-01 VITALS — WEIGHT: 173 LBS | BODY MASS INDEX: 27.1 KG/M2

## 2021-06-01 VITALS — BODY MASS INDEX: 27.62 KG/M2 | WEIGHT: 176 LBS | HEIGHT: 67 IN

## 2021-06-02 VITALS — HEIGHT: 67 IN | WEIGHT: 176 LBS | BODY MASS INDEX: 27.62 KG/M2

## 2021-06-04 VITALS
DIASTOLIC BLOOD PRESSURE: 70 MMHG | TEMPERATURE: 98.9 F | HEART RATE: 104 BPM | HEIGHT: 67 IN | WEIGHT: 183.25 LBS | BODY MASS INDEX: 28.76 KG/M2 | RESPIRATION RATE: 24 BRPM | SYSTOLIC BLOOD PRESSURE: 124 MMHG

## 2021-06-05 NOTE — TELEPHONE ENCOUNTER
RN cannot approve Refill Request    RN can NOT refill this medication Protocol failed and NO refill given.       [facility] Phelps, [facility] Triage/Med Refill 1/29/2020    Requested Prescriptions   Pending Prescriptions Disp Refills     venlafaxine (EFFEXOR-XR) 75 MG 24 hr capsule [Pharmacy Med Name: VENLAFAXINE HCL ER CAPS 75MG] 90 capsule 4     Sig: TAKE 1 CAPSULE DAILY       Venlafaxine/Desvenlafaxine Refill Protocol Failed - 1/28/2020 11:34 PM        Failed - LFT or AST or ALT in last year     Albumin   Date Value Ref Range Status   12/14/2017 3.6 3.5 - 5.0 g/dL Final     Bilirubin, Total   Date Value Ref Range Status   12/14/2017 0.5 0.0 - 1.0 mg/dL Final     Bilirubin, Direct   Date Value Ref Range Status   08/28/2017 0.2 <=0.5 mg/dL Final     Alkaline Phosphatase   Date Value Ref Range Status   12/14/2017 63 45 - 120 U/L Final     AST   Date Value Ref Range Status   12/14/2017 13 0 - 40 U/L Final     ALT   Date Value Ref Range Status   12/14/2017 14 0 - 45 U/L Final     Protein, Total   Date Value Ref Range Status   12/14/2017 6.7 6.0 - 8.0 g/dL Final                Failed - Fasting lipid cascade in last year     Cholesterol   Date Value Ref Range Status   05/29/2015 143 <=199 mg/dL Final     Triglycerides   Date Value Ref Range Status   05/29/2015 128 <=149 mg/dL Final     HDL Cholesterol   Date Value Ref Range Status   05/29/2015 46 >=40 mg/dL Final     LDL Calculated   Date Value Ref Range Status   05/29/2015 71 0 - 129 mg/dL Final     Patient Fasting > 8hrs?   Date Value Ref Range Status   05/29/2015 Yes  Final             Failed - PCP or prescribing provider visit in last year     Last office visit with prescriber/PCP: 11/1/2018 Israel Ng MD OR same dept: Visit date not found OR same specialty: 11/1/2018 Israel Ng MD  Last physical: 5/5/2017 Last MTM visit: Visit date not found   Next visit within 3 mo: Visit date not found  Next physical within 3 mo: Visit date not  found  Prescriber OR PCP: Israel Ng MD  Last diagnosis associated with med order: 1. Anxiety  - venlafaxine (EFFEXOR-XR) 75 MG 24 hr capsule [Pharmacy Med Name: VENLAFAXINE HCL ER CAPS 75MG]; TAKE 1 CAPSULE DAILY  Dispense: 90 capsule; Refill: 4    If protocol passes may refill for 12 months if within 3 months of last provider visit (or a total of 15 months).             Failed - Blood Pressure in last year     BP Readings from Last 1 Encounters:   11/01/18 110/64

## 2021-06-06 NOTE — TELEPHONE ENCOUNTER
Spoke to pt relayed Dr. Galvan's message as follows:    Diflucan sent to pharmacy. If not improving in the next 72 hours, should be seen.   Jaelyn Galvan, DO

## 2021-06-06 NOTE — TELEPHONE ENCOUNTER
Medication Request  Medication name: Diflucan  Requested Pharmacy: [facility]  Reason for request: was seen for sinus infection and put on Augmentin and has now developed a yeast infection.  When did you use medication last?:  n/a  Patient offered appointment:  yes  Okay to leave a detailed message: yes

## 2021-06-06 NOTE — TELEPHONE ENCOUNTER
"    Call from pt         CC:  Facial pain >24hrs  + Cold sx since last week  + Fever       Fevers that come and go - this am 100F     Yes periodic achyiness    Yes chest congestion   Yes feels a little shortness of breath  - sounds ok on the phone to me     Yes swollen glands  / tender       Facial pain >24hrs     Runny nose Sunday  - clear drainage - \"hard to keep up with it\"        Yes productive cough   Yes sore throat - from coughing ?      No recent travel   No known sick contacts         A/P:   > Appt for today for eval     Discussed at home routine cold / flu symptoms management including   >Fluids - body needs to stay hydrated - drink more water to stay hydrated   > Humidifier / steam showers - these are excellent ways to help stuffy noses, nasal or chest congestion and with any coughing or sore throats      > Watch your temp and breathing - call back if you start spiking a high temperature 103F+ or have any breathing problems or if the sx change or worsen in some way          hanna khan rn               Reason for Disposition    Face pain present > 24 hours    Protocols used: FACE PAIN-A-OH      "

## 2021-06-06 NOTE — TELEPHONE ENCOUNTER
Agree with advice given. She was treated for a sinus infection. It is good that she is significantly better. I recommend continuing with conservative measures. Extending antibiotics are not needed. As long as there are no concerns regarding COVID-19 (risk of exposure, travel history etc) she can set up her mammogram. She may want to wait until her symptoms completely resolve.

## 2021-06-06 NOTE — PROGRESS NOTES
[facility] Note    Name: Deyanira Kelley  : 1965   MRN: [id number]    Deyanira Kelley is a 54 y.o. female presenting to discuss the following:     CC:   Chief Complaint   Patient presents with     Facial Pain     Nasal Congestion     HPI:  Started with tickle in chest on Wednesday or Thursday. Has been coughing up green gunk. Fever on Wednesday went away, felt better on Saturday. Nose opened up on  and couldn't keep up. Fevers came back. Not taking any medications.     Legs and hips are sore.     ROS:   CONSTITUTIONAL: Energy is decreased, decreased appetite.   HEENT: No ear pain. Sore throat. Facial pain in maxillary sinus region.  LUNGS: Mild shortness of breath with stairs.   ABDOMEN: No nausea, no abdominal pain, no changes in bowel movements.    PMH:   Patient Active Problem List   Diagnosis     Hyperlipidemia     Rectocele     Urge incontinence     Major depression     Anxiety     Lymphocytic colitis     Type II diabetes mellitus, uncontrolled (H)     Obesity     Diarrhea       Past Medical History:   Diagnosis Date     Anxiety      Depression      Diabetes mellitus (H)        PSH:   Past Surgical History:   Procedure Laterality Date     HYSTERECTOMY       MS REMOVAL OF OVARY(S)      Description: Oophorectomy;  Recorded: 2007;  Comments:      MS VAG HYST,RMV VAG,FIX ENTEROCELE      Description: Vaginal Hysterectomy With Colpectomy & Enterocele Repair;  Recorded: 2007;  Comments:  Joe Shipman and Ashlie         MEDICATIONS:   Current Outpatient Medications on File Prior to Visit   Medication Sig Dispense Refill     ACETONE, URINE, TEST strip        atorvastatin (LIPITOR) 20 MG tablet Take 20 mg by mouth daily.        HUMALOG 100 unit/mL injection        HUMALOG KWIKPEN INSULIN 100 unit/mL pen        insulin pump controller Misc As directed.       nicotine, polacrilex, (NICORETTE) 2 mg lozenge Apply 2 mg to cheek.       OMNIPOD DASH 5 PACK POD U UTD FOR CONTINUOUS INSULIN  "DELIVERY - CHANGE EVERY 2 DAYS       ONETOUCH DELICA LANCETS 30 gauge Misc        potassium chloride (K-DUR,KLOR-CON) 20 MEQ tablet        bismuth subsalicylate (BISMUTH SUBSALICYLATE) 262 mg Chew chew tab Chew 3 tablets 3 (three) times a day.       blood glucose test (ONETOUCH VERIO) strips Test 5 times per day.       glipiZIDE (GLUCOTROL XL) 2.5 MG 24 hr tablet Take 15 mg by mouth.       insulin glargine (LANTUS SOLOSTAR U-100 INSULIN) 100 unit/mL (3 mL) pen Inject 20 Units under the skin every morning.        metFORMIN (GLUCOPHAGE-XR) 500 MG 24 hr tablet Take 2,000 mg by mouth daily.        tiZANidine (ZANAFLEX) 2 MG tablet Take 1-2 tablets (2-4 mg total) by mouth every 8 (eight) hours as needed. 28 tablet 0     venlafaxine (EFFEXOR XR) 37.5 MG 24 hr capsule Take two capsules PO Qday 28 capsule 0     venlafaxine (EFFEXOR-XR) 75 MG 24 hr capsule Take one PO Qday. An office visit is needed. 60 capsule 0     No current facility-administered medications on file prior to visit.        ALLERGIES:  Allergies   Allergen Reactions     Oxycodone-Acetaminophen Itching     Empagliflozin Rash       FAMHx:  Family History   Problem Relation Age of Onset     Heart disease Mother      Cancer Father      Diabetes Father      Stroke Maternal Grandmother      Breast cancer Neg Hx        SOCIAL HISTORY:   Social History     Tobacco Use     Smoking status: Former Smoker     Smokeless tobacco: Never Used   Substance Use Topics     Alcohol use: Not on file     Drug use: Not on file       PHYSICAL EXAM:   /70   Pulse (!) 104   Temp 98.9  F (37.2  C) (Oral)   Resp 24   Ht 5' 7\" (1.702 m)   Wt 183 lb 4 oz (83.1 kg)   LMP  (LMP Unknown)   BMI 28.70 kg/m     GENERAL: Deyanira is a pleasant, overweight female, appears ill today but non-toxic.   HEENT: Sclera white, nasal congestion present, tympanic membranes normal bilaterally, no cervical lymphadenopathy.   HEART: Regular rate and rhythm, no murmurs.   LUNGS: Clear to " auscultation bilaterally, unlabored.   ABDOMEN: Soft, non-tender to palpation.    ASSESSMENT & PLAN:   Deyanira Kelley is a 54 y.o. female presenting today for evaluation of URI symptoms.    1. Acute sinusitis with symptoms > 10 days  - amoxicillin-clavulanate (AUGMENTIN) 875-125 mg per tablet; Take 1 tablet by mouth 2 (two) times a day for 7 days.  Dispense: 14 tablet; Refill: 0    Due to initial improvement with worsening of facial pressure and recurrence of fevers (double worsening sign), will treat for acute bacterial sinusitis. If symptoms are not improving, return for further evaluation.     Discussed possibility of influenza. She is outside the treatment window. Discussed poor sensitivity of flu testing, as wouldn't change treatment regimen will not test.     2. Visit for screening mammogram  - Mammo Screening Bilateral; Future     RTC: 1 month - annual physical exam / sooner as needed     Jaelyn Galvan, DO

## 2021-06-06 NOTE — TELEPHONE ENCOUNTER
RN triage   Call from pt   Pt states she was transferred from mammogram   -- told because she was sick last week, they wanted her to talk to triage   Pt seen in clinc 3/4 -- treated w/ antibx -- completed antibx on 3/10 -   Since seen in clinic = no fever -- still has occ productive cough and a little sinus drainage --   No facial pain -- no pain when pressing on cheeks/forehead   Breathing OK -- feeling OK   Pt states feels ' 97% back to normal '   No recent travels   No further advise per triage   Any PCP advice ?  OK to get mammogram ?  Tess Walters RN [facility] RN triage    Reason for Disposition    Nursing judgment    Protocols used: NO PROTOCOL AVAILABLE - SICK ADULT-A-[place]

## 2021-06-07 NOTE — TELEPHONE ENCOUNTER
RN cannot approve Refill Request    RN can NOT refill this medication Protocol failed and NO refill given.       Marietta Phelps, [facility] Triage/Med Refill 3/31/2020    Requested Prescriptions   Pending Prescriptions Disp Refills     venlafaxine (EFFEXOR-XR) 75 MG 24 hr capsule [Pharmacy Med Name: VENLAFAXINE HCL ER CAPS 75MG] 60 capsule 5     Sig: TAKE 1 CAPSULE DAILY (AN OFFICE VISIT IS NEEDED)       Venlafaxine/Desvenlafaxine Refill Protocol Failed - 3/30/2020 11:33 PM        Failed - LFT or AST or ALT in last year     Albumin   Date Value Ref Range Status   12/14/2017 3.6 3.5 - 5.0 g/dL Final     Bilirubin, Total   Date Value Ref Range Status   12/14/2017 0.5 0.0 - 1.0 mg/dL Final     Bilirubin, Direct   Date Value Ref Range Status   08/28/2017 0.2 <=0.5 mg/dL Final     Alkaline Phosphatase   Date Value Ref Range Status   12/14/2017 63 45 - 120 U/L Final     AST   Date Value Ref Range Status   12/14/2017 13 0 - 40 U/L Final     ALT   Date Value Ref Range Status   12/14/2017 14 0 - 45 U/L Final     Protein, Total   Date Value Ref Range Status   12/14/2017 6.7 6.0 - 8.0 g/dL Final                Failed - Fasting lipid cascade in last year     Cholesterol   Date Value Ref Range Status   05/29/2015 143 <=199 mg/dL Final     Triglycerides   Date Value Ref Range Status   05/29/2015 128 <=149 mg/dL Final     HDL Cholesterol   Date Value Ref Range Status   05/29/2015 46 >=40 mg/dL Final     LDL Calculated   Date Value Ref Range Status   05/29/2015 71 0 - 129 mg/dL Final     Patient Fasting > 8hrs?   Date Value Ref Range Status   05/29/2015 Yes  Final             Passed - PCP or prescribing provider visit in last year     Last office visit with prescriber/PCP: 11/1/2018 Israel Ng MD OR same dept: 3/3/2020 Jaelyn Galvan DO OR same specialty: 3/3/2020 Jaelyn Galvan DO  Last physical: 5/5/2017 Last MTM visit: Visit date not found   Next visit within 3 mo: Visit date not found  Next physical within  3 mo: Visit date not found  Prescriber OR PCP: Israel Ng MD  Last diagnosis associated with med order: 1. Anxiety  - venlafaxine (EFFEXOR-XR) 75 MG 24 hr capsule [Pharmacy Med Name: VENLAFAXINE HCL ER CAPS 75MG]; TAKE 1 CAPSULE DAILY (AN OFFICE VISIT IS NEEDED)  Dispense: 60 capsule; Refill: 5    If protocol passes may refill for 12 months if within 3 months of last provider visit (or a total of 15 months).             Passed - Blood Pressure in last year     BP Readings from Last 1 Encounters:   03/03/20 124/70

## 2021-06-08 NOTE — TELEPHONE ENCOUNTER
RN cannot approve Refill Request    RN can NOT refill this medication PCP messaged that patient is overdue for Labs. Last office visit: 11/1/2018 Israel Ng MD Last Physical: 5/5/2017 Last MTM visit: Visit date not found Last visit same specialty: 3/3/2020 Jaelyn Galvan DO.  Next visit within 3 mo: Visit date not found  Next physical within 3 mo: Visit date not found      Karlee Flores, [facility] Triage/Med Refill 5/31/2020    Requested Prescriptions   Pending Prescriptions Disp Refills     venlafaxine (EFFEXOR-XR) 75 MG 24 hr capsule [Pharmacy Med Name: VENLAFAXINE HCL ER CAPS 75MG] 60 capsule 5     Sig: TAKE 1 CAPSULE DAILY (AN OFFICE VISIT IS NEEDED)       Venlafaxine/Desvenlafaxine Refill Protocol Failed - 5/27/2020 11:54 PM        Failed - LFT or AST or ALT in last year     Albumin   Date Value Ref Range Status   12/14/2017 3.6 3.5 - 5.0 g/dL Final     Bilirubin, Total   Date Value Ref Range Status   12/14/2017 0.5 0.0 - 1.0 mg/dL Final     Bilirubin, Direct   Date Value Ref Range Status   08/28/2017 0.2 <=0.5 mg/dL Final     Alkaline Phosphatase   Date Value Ref Range Status   12/14/2017 63 45 - 120 U/L Final     AST   Date Value Ref Range Status   12/14/2017 13 0 - 40 U/L Final     ALT   Date Value Ref Range Status   12/14/2017 14 0 - 45 U/L Final     Protein, Total   Date Value Ref Range Status   12/14/2017 6.7 6.0 - 8.0 g/dL Final                Failed - Fasting lipid cascade in last year     Cholesterol   Date Value Ref Range Status   05/29/2015 143 <=199 mg/dL Final     Triglycerides   Date Value Ref Range Status   05/29/2015 128 <=149 mg/dL Final     HDL Cholesterol   Date Value Ref Range Status   05/29/2015 46 >=40 mg/dL Final     LDL Calculated   Date Value Ref Range Status   05/29/2015 71 0 - 129 mg/dL Final     Patient Fasting > 8hrs?   Date Value Ref Range Status   05/29/2015 Yes  Final             Passed - PCP or prescribing provider visit in last year     Last office  visit with prescriber/PCP: 11/1/2018 Israel Ng MD OR same dept: 3/3/2020 Jaelyn Galvan DO OR same specialty: 3/3/2020 Jaelyn Galvan DO  Last physical: 5/5/2017 Last MTM visit: Visit date not found   Next visit within 3 mo: Visit date not found  Next physical within 3 mo: Visit date not found  Prescriber OR PCP: Israel Ng MD  Last diagnosis associated with med order: 1. Anxiety  - venlafaxine (EFFEXOR-XR) 75 MG 24 hr capsule [Pharmacy Med Name: VENLAFAXINE HCL ER CAPS 75MG]; TAKE 1 CAPSULE DAILY (AN OFFICE VISIT IS NEEDED)  Dispense: 60 capsule; Refill: 5    If protocol passes may refill for 12 months if within 3 months of last provider visit (or a total of 15 months).             Passed - Blood Pressure in last year     BP Readings from Last 1 Encounters:   03/03/20 124/70

## 2021-06-10 NOTE — PROGRESS NOTES
"Deyanira Kelley is a 55 y.o. female who is being evaluated via a billable video visit.      The patient has been notified of following:     \"This video visit will be conducted via a call between you and your physician/provider. We have found that certain health care needs can be provided without the need for an in-person physical exam.  This service lets us provide the care you need with a video conversation.  If a prescription is necessary we can send it directly to your pharmacy.  If lab work is needed we can place an order for that and you can then stop by our lab to have the test done at a later time.    Video visits are billed at different rates depending on your insurance coverage. Please reach out to your insurance provider with any questions.    If during the course of the call the physician/provider feels a video visit is not appropriate, you will not be charged for this service.\"    Patient has given verbal consent to a Video visit? Yes  How would you like to obtain your AVS? AVS Preference: MyChart.  If dropped by the video visit, the video invitation should be sent to: Other e-mail: My Chart  Will anyone else be joining your video visit? No        Video Start Time: Video attempted. Telephone visit    Additional provider notes: See visit notes    Assessment/ Plan     1. Anxiety  2. Current moderate episode of major depressive disorder without prior episode (H)    PHQ-9 score is 15  Patient is adamant that she has no thoughts of self-harm and feels safe  LAURIE-7 score is 21  Discussed options including follow-up with a counselor/ psychologist  Patient has taken venlafaxine but did not feel that this was effective  She would like to consider a different location   Patient will start sertraline 25 mg daily  Reviewed side effects and time to reach therapeutic effect  Recommend providing an update in 4 weeks    - sertraline (ZOLOFT) 25 MG tablet; Take 1 tablet (25 mg total) by mouth daily.  Dispense: 60 tablet; " Refill: 1    3.  Type 2 diabetes mellitus, poorly controlled     She has an insulin pump and is treated with metformin  Recommend that she continue work on diet and excise  Follow-up with endocrinology on a consistent basis      Subjective:       Deyanira Kelley is a 55 y.o. female who presents for a telephone visit during the COVID-19 pandemic.    Her primary concern has been anxiety as well as depression symptoms.  She notes that over the past 3 or 4 months she has had a short fuse.  She has been quite irritable.  She has had an increase in anxiety.  There have been many stressors including the COVID-19 pandemic.  Her  lost his job and she has been working from home.  They spend a lot of time together and this can be challenging.  She notes that she has become more obsessive.  She admits that she has had little interest or pleasure or doing things most days.  She has felt depressed.  Her appetite and sleep have been disrupted.  She has had some thoughts of self-harm but these have been passive and she has no active plan and does feel safe.  She states that she would never do anything to harm herself.  She has been feeling very anxious as well.  Most days she has had a lot of worry and anxiety about many things.  In the past she was treated with venlafaxine but did not feel that this was helpful.  She would like to consider other options.    Medical history is otherwise notable for type 2 diabetes mellitus which is managed by endocrinology.  Her last hemoglobin A1c was 7.6%.  She plans to follow-up in the near future with her endocrinologist.    She is a history of hyperlipidemia, chronic diarrhea with lymphocytic colitis and depression and is anxiety as noted.  She has followed up with Dr. Lopez as her endocrinologist.  She has been treated with an insulin pump and metformin.     The following portions of the patient's history were reviewed and updated as appropriate: allergies, current medications, past  family history, past medical history, past social history, past surgical history and problem list. Medications have been reconciled    Review of Systems   A 12 point comprehensive review of systems was negative except as noted.      Current Outpatient Medications   Medication Sig Dispense Refill     ACETONE, URINE, TEST strip        atorvastatin (LIPITOR) 20 MG tablet Take 20 mg by mouth daily.        blood glucose test (ONETOUCH VERIO) strips Test 5 times per day.       HUMALOG 100 unit/mL injection        HUMALOG KWIKPEN INSULIN 100 unit/mL pen        insulin pump controller Misc As directed.       metFORMIN (GLUCOPHAGE-XR) 500 MG 24 hr tablet Take 2,000 mg by mouth daily.        nicotine, polacrilex, (NICORETTE) 2 mg lozenge Apply 2 mg to cheek.       OMNIPOD DASH 5 PACK POD U UTD FOR CONTINUOUS INSULIN DELIVERY - CHANGE EVERY 2 DAYS       ONETOUCH DELICA LANCETS 30 gauge Misc        potassium chloride (K-DUR,KLOR-CON) 20 MEQ tablet        sertraline (ZOLOFT) 25 MG tablet Take 1 tablet (25 mg total) by mouth daily. 60 tablet 1     venlafaxine (EFFEXOR XR) 37.5 MG 24 hr capsule Take two capsules PO Qday 28 capsule 0     venlafaxine (EFFEXOR-XR) 75 MG 24 hr capsule TAKE 1 CAPSULE DAILY (AN OFFICE VISIT IS NEEDED) 60 capsule 2     No current facility-administered medications for this visit.        Objective:      LMP  (LMP Unknown)     Examination not performed as this is a telephone visit  Genearal: Does not sound acutely distressed  There is no audible wheezing  Speech is clear  Judgment and insight are intact    No results found for this or any previous visit (from the past 168 hour(s)).       This note has been dictated using voice recognition software. Any grammatical or context distortions are unintentional and inherent to the software    Israel Ng MD      Video-Visit Details    Type of service:  Video Visit    Video End Time (time video stopped): 12 minutes total for telephone visit  Originating  Location (pt. Location): Home    Distant Location (provider location):  [facility] MEDICINE/OB     Platform used for Video Visit: Unable to complete video visit      Israel Ng MD

## 2021-06-10 NOTE — PROGRESS NOTES
SUBJECTIVE:    This is a pleasant 52-year-old female who presents to the clinic for a complete physical examination.  Her medical history is notable for type 2 diabetes mellitus and hyperlipidemia.  She has had a previous hysterectomy and has had concerns with urge incontinence since then.  She reportedl did have a urethral sling.  Unfortunately,y this has been a challenging issue as she will experience frequent leakage.  It can be difficult for her to get to the restroom on time to urinate.  Regarding her diabetes, her last hemoglobin A1c was 6.9%.  She continues to take metformin as well as Januvia.  Her last total cholesterol was 180 with an LDL of 85.  She continues to take atorvastatin.  She does follow up with endocrinology.  In the past she did get diagnosed with lymphocytic colitis which contributed to diarrhea.  She can have frequent bowel movements at times will take Imodium.  Her last mammogram was this past week and those results are not currently available.  Review of systems is negative for headache, dizziness, chest pain, or palpitations.      Patient Active Problem List   Diagnosis     Hyperlipidemia     Rectocele     Uterine Prolapse     Type 2 diabetes mellitus     Urge incontinence       Current Outpatient Prescriptions on File Prior to Visit   Medication Sig     atorvastatin (LIPITOR) 20 MG tablet Take 20 mg by mouth.     blood glucose test (ONETOUCH VERIO) strips Test 5 times per day.     metFORMIN (GLUMETZA) 500 MG (MOD) 24 hr tablet Take 500 mg by mouth 2 (two) times a day with meals.     nicotine polacrilex (COMMIT) 4 MG lozenge Apply 4 mg to the mouth or throat as needed for smoking cessation.     ONETOUCH DELICA LANCETS 30 gauge Misc      sertraline (ZOLOFT) 50 MG tablet Take 50 mg by mouth.     No current facility-administered medications on file prior to visit.        Allergies   Allergen Reactions     Oxycodone-Acetaminophen Itching     Empagliflozin Rash            A 12 point  comprehensive review of systems was negative except as noted.      OBJECTIVE:     Vitals:    05/05/17 1444   BP: 126/70   Pulse: 76   Resp: 16   Temp: 98.7  F (37.1  C)       General: Alert, not acutely distressed   Head:  Atraumatic, normocephalic  Ears:  TMs normal pearly-gray  Eyes:  PERRL, extraocular movements are intact  Nose:  Septum midline  Throat:  Oral mucosa moist, oropharynx clear  Neck:  Supple without adenopathy or thyromegaly   Cardiovascular: S1-S2 with regular rate and without murmur, rub, or gallop   Lungs:  Clear to auscultation bilaterally   Abdomen:  Soft, non tender, nondistended  Extremities: Without cyanosis or edema   Neuro:  CN II-XII appear intact        Laboratory Results:    Results for orders placed or performed in visit on 05/05/17   Microalbumin, Random Urine   Result Value Ref Range    Microalbumin, Random Urine 0.57 0.00 - 1.99 mg/dL    Creatinine, Urine 85.8 mg/dL    Microalbumin/Creatinine Ratio Random Urine 6.6 <=19.9 mg/g         ASSESSMENT AND PLAN:    1. Routine general medical examination at a health care facility    Recommend adequate calcium and vitamin D  Recommend weightbearing exercise  Her colonoscopy is up-to-date from January 2014 and this was normal  Follow-up as recommended by the breast center for her mammogram      2. Type 2 diabetes mellitus    Recommend that she continue to follow-up with Dr. Lopez, endocrinology  Encourage a daily aspirin  Continue metformin and resume Januvia  Check a microalbumin and recommend an ACE inhibitor depending on results.  She can discuss this with Dr. Lopez as well  - Microalbumin, Random Urine  Recommend an annual eye examination  Recommend foot care  Continue statin    3. Hyperlipidemia    Continue atorvastatin    4. Urinary incontinence    Refer to [facility] given her concerns with urinary incontinence  - Ambulatory referral to Urology        Israel Ng MD      This note has been dictated and  transcribed using voice recognition software.  Any grammatical or contextual distortions are unintentional and inherent to the software.

## 2021-06-11 NOTE — TELEPHONE ENCOUNTER
Spoke to pt, notified we do not have Dr. Ng's schedule for October.  She would like to be scheduled for a phone visit once schedule is available. Writer let her know clinic will reach out to her to get her scheduled once we have Dr. Ng's schedule.

## 2021-06-11 NOTE — TELEPHONE ENCOUNTER
New Appointment Needed  What is the reason for the visit:    Anxiety medication review, also would like to switch to [facility]  Provider Preference: PCP only, writer unable to schedule with Dr Ng due to restrictions on schedule  How soon do you need to be seen?: Prefers Tuesday and Thursday afternoons.  Waitlist offered?: Yes  Okay to leave a detailed message:  Yes

## 2021-06-12 NOTE — PROGRESS NOTES
Assessment/ Plan     1. Major depression  2. Anxiety    Continue sertraline 100 mg daily    She was given information and resources so she can follow-up with a counselor    A PHQ-9 score is 6  A LAURIE-7 score is 5  Recommend adequate rest and appropriate nutrition  Given concerns regarding possible menopause we will check an FSH    - Follicle Stimulating Hormone (FSH)    Paperwork was completed.  She may return to work on September 11, 2017    25 minutes were spent with the patient and greater than 50% of the time was spent in face to face counseling and coordination of care        Subjective:       Deyanira Kelley is a 52 y.o. female who presents  to the clinic in follow-up for depression and anxiety symptoms.  She reports that she finally is beginning to feel better after remaining off of work.  Sertraline was increased to 100 mg.  She has been able to get some adequate rest and is improving to the point where she feels like she can return to work early next week.  Paperwork needs to be completed.      As noted at a previous office visit she recently presented to the clinic and had reported that she was feeling very stressed as result of work.  She states that one individual left and another has been on vacation and as result she has been doing the work of three people.  This has been very difficult for her.  She has felt exhausted.  Her concentration has been poor.  She has been feeling very depressed and has had difficulty with sleep.  Her energy level is often low and she feels very badly.  She has had little interest or pleasure in doing things.  She has had thoughts of self-harm as well.  Also, she has been feeling extremely anxious and has not been able to control her worrying.  Her symptoms were so severe she actually was experiencing suicidal ideation.  As result, recommendations were made for her to stay home from work.  She has been trying to get more rest though this has been difficult as they are getting a  "new roof put on their home.  She increased sertraline to 100 mg daily    She completed a PHQ-9 evaluation and scored a total of 6 which represents an improvement.  A LAURIE-7 score improved to 5.  She does wonder whether she is going through menopause.  She has had a total hysterectomy.  She does have occasional hot flashes.  Overall, she feels better and would like to continue her current medication.  She feels like she can return to work early next week.    The following portions of the patient's history were reviewed and updated as appropriate: allergies, current medications, past family history, past medical history, past social history, past surgical history and problem list.    Review of Systems   A 12 point comprehensive review of systems was negative except as noted.      Current Outpatient Prescriptions   Medication Sig Dispense Refill     aspirin 81 MG EC tablet Take 1 tablet (81 mg total) by mouth daily.  0     atorvastatin (LIPITOR) 20 MG tablet Take 20 mg by mouth.       blood glucose test (ONETOUCH VERIO) strips Test 5 times per day.       glipiZIDE (GLUCOTROL XL) 5 MG 24 hr tablet Take 10 mg by mouth daily.       metFORMIN (GLUMETZA) 500 MG (MOD) 24 hr tablet Take 500 mg by mouth 2 (two) times a day with meals.       nicotine polacrilex (COMMIT) 4 MG lozenge Apply 4 mg to the mouth or throat as needed for smoking cessation.       ONETOUCH DELICA LANCETS 30 gauge Misc        sertraline (ZOLOFT) 100 MG tablet Take 1 tablet (100 mg total) by mouth daily. 90 tablet 3     No current facility-administered medications for this visit.        Objective:      /70  Pulse 80  Temp 98  F (36.7  C) (Oral)   Resp 16  Ht 5' 7\" (1.702 m)  Wt 171 lb (77.6 kg)  LMP  (LMP Unknown)  BMI 26.78 kg/m2      General appearance: alert, appears stated age and cooperative  Head: Normocephalic, without obvious abnormality, atraumatic  Neurologic: Alert and oriented X 3  Affect is brighter than a previous visit   "       Recent Results (from the past 168 hour(s))   Follicle Stimulating Hormone (FSH)   Result Value Ref Range    FSH 10.1 mIU/mL          This note has been dictated using voice recognition software. Any grammatical or context distortions are unintentional and inherent to the software

## 2021-06-12 NOTE — PROGRESS NOTES
Assessment/ Plan     1. Major depression  2. Anxiety  3. Situational stressors    PHQ-9 score is 20  Sertraline was increased to 100 mg a day at the last visit and this may take a few weeks to reach therapeutic effect  She will remain off of work.  Short-term disability paperwork was completed.  At this point she will aim for returning to work on September 11, 2017  Recommend follow-up with a psychologist  Recommend immediate follow-up if having thoughts of self-harm or worsening depression symptoms  Patient verbalized understanding and agrees to the above plan    4.  Neck pain, likely muscular    Recommend a trial of anti-inflammatory medication including ibuprofen 600 mg every 8 hours as needed  Recommend application of heat or cool packs  Consider a muscle relaxant such as baclofen  She can be referred for physical therapy if not improving  Patient agrees to follow-up as needed        40 minutes were spent with the patient and greater than 50% of the time was spent in face to face counseling and coordination of care  Short-term disability forms were completed for the patient in detail and will be scanned into the chart        Subjective:       Deyanira Kelley is a 52 y.o. female who presents to the clinic in follow-up for depression and anxiety symptoms.  She recently presented to the clinic and had reported that she was feeling very stressed as result of work.  She states that one individual left and another has been on vacation and as result she has been doing the work of three people.  This has been very difficult for her.  She has felt exhausted.  Her concentration has been poor.  She has been feeling very depressed and has had difficulty with sleep.  Her energy level is often low and she feels very badly.  She has had little interest or pleasure in doing things.  She has had thoughts of self-harm as well.  Also, she has been feeling extremely anxious and has not been able to control her worrying.  Her symptoms  were so severe she actually was experiencing suicidal ideation.  As result, recommendations were made for her to stay home from work.  She has been trying to get more rest though this has been difficult as they are getting a new roof put on their home.  She increased sertraline to 100 mg daily.  She continues to feel exhausted and stressed.  She does not yet feel ready to go back to work.  She is pursuing short-term disability.  She states that she feels safe and has not been having significant thoughts of self-harm.    Next, she has been experiencing some pain in the neck.  This has been localized to the right occipital region.  She cannot think of any specific injury.  This can radiate to the right upper part of her back.  There is no radiation down her upper extremities.  She will have associated headaches at times.    The following portions of the patient's history were reviewed and updated as appropriate: allergies, current medications, past family history, past medical history, past social history, past surgical history and problem list.    Review of Systems   A 12 point comprehensive review of systems was negative except as noted.      Current Outpatient Prescriptions   Medication Sig Dispense Refill     aspirin 81 MG EC tablet Take 1 tablet (81 mg total) by mouth daily.  0     atorvastatin (LIPITOR) 20 MG tablet Take 20 mg by mouth.       blood glucose test (ONETOUCH VERIO) strips Test 5 times per day.       glipiZIDE (GLUCOTROL XL) 5 MG 24 hr tablet Take 10 mg by mouth daily.       metFORMIN (GLUMETZA) 500 MG (MOD) 24 hr tablet Take 500 mg by mouth 2 (two) times a day with meals.       nicotine polacrilex (COMMIT) 4 MG lozenge Apply 4 mg to the mouth or throat as needed for smoking cessation.       ONETOUCH DELICA LANCETS 30 gauge Misc        sertraline (ZOLOFT) 100 MG tablet Take 1 tablet (100 mg total) by mouth daily. 90 tablet 3     sertraline (ZOLOFT) 50 MG tablet Take 50 mg by mouth.       No current  "facility-administered medications for this visit.        Objective:      /62  Pulse 76  Temp 98.9  F (37.2  C) (Oral)   Resp 16  Ht 5' 7\" (1.702 m)  Wt 171 lb (77.6 kg)  LMP  (LMP Unknown)  Breastfeeding? No  BMI 26.78 kg/m2      General appearance: alert, appears stated age and cooperative  Head: Normocephalic, without obvious abnormality, atraumatic  Neurologic: Alert and oriented X 3. Normal coordination and gait         Recent Results (from the past 168 hour(s))   Basic Metabolic Panel   Result Value Ref Range    Sodium 143 136 - 145 mmol/L    Potassium 3.3 (L) 3.5 - 5.0 mmol/L    Chloride 106 98 - 107 mmol/L    CO2 28 22 - 31 mmol/L    Anion Gap, Calculation 9 5 - 18 mmol/L    Glucose 181 (H) 70 - 125 mg/dL    Calcium 9.0 8.5 - 10.5 mg/dL    BUN 12 8 - 22 mg/dL    Creatinine 0.85 0.60 - 1.10 mg/dL    GFR MDRD Af Amer >60 >60 mL/min/1.73m2    GFR MDRD Non Af Amer >60 >60 mL/min/1.73m2   Glycosylated Hemoglobin A1c   Result Value Ref Range    Hemoglobin A1c 7.7 (H) 3.5 - 6.0 %   HM2(CBC w/o Differential)   Result Value Ref Range    WBC 7.7 4.0 - 11.0 thou/uL    RBC 4.42 3.80 - 5.40 mill/uL    Hemoglobin 13.6 12.0 - 16.0 g/dL    Hematocrit 40.2 35.0 - 47.0 %    MCV 91 80 - 100 fL    MCH 30.8 27.0 - 34.0 pg    MCHC 33.8 32.0 - 36.0 g/dL    RDW 11.8 11.0 - 14.5 %    Platelets 244 140 - 440 thou/uL    MPV 7.8 7.0 - 10.0 fL   Thyroid Cascade   Result Value Ref Range    TSH 2.42 0.30 - 5.00 uIU/mL   Hepatic Profile   Result Value Ref Range    Bilirubin, Total 0.7 0.0 - 1.0 mg/dL    Bilirubin, Direct 0.2 <=0.5 mg/dL    Protein, Total 6.6 6.0 - 8.0 g/dL    Albumin 3.8 3.5 - 5.0 g/dL    Alkaline Phosphatase 61 45 - 120 U/L    AST 18 0 - 40 U/L    ALT 18 0 - 45 U/L   LDL Cholesterol, Direct   Result Value Ref Range    Direct LDL 87 <=129 mg/dl          This note has been dictated using voice recognition software. Any grammatical or context distortions are unintentional and inherent to the software  "

## 2021-06-12 NOTE — PROGRESS NOTES
Assessment/ Plan     1. Major depression  2. Anxiety  Significant situational stressors, especially related to her job  FMLA paperwork can be completed if needed    A PHQ-9 score is 25  A LAURIE-7 score is 21    Reviewed patient's safety.  At this point she has had thoughts of self-harm but is adamant that she would not harm herself  Recommend immediate follow-up for crisis evaluation if feeling more depressed or having concerns with self-harm  Patient declines an inpatient admission and was adamant that she will not harm herself    Given how her job is negatively impacting her symptoms she will remain off of work at least for the next week  Increase sertraline to 100 mg a day  We will consider referral to psychiatry  Favor follow-up with a psychologist as well    3. Fatigue    Likely related to her depression and anxiety.  She has a history of type 2 diabetes mellitus    Check laboratory testing as she is due    She will continue metformin and glipizide    - Glycosylated Hemoglobin A1c  - HM2(CBC w/o Differential)  - Thyroid Cascade  - Hepatic Profile    4. Type 2 diabetes mellitus    Check laboratory testing  She continues to follow-up with endocrinology  Continue metformin and glipizide    Her urinary microalbumin test was normal    - Basic Metabolic Panel  - Glycosylated Hemoglobin A1c  - LDL Cholesterol, Direct    40 minutes were spent with the patient and greater than 50% of the time was spent in face to face counseling and coordination of care        Subjective:       Deyanira Kelley is a 52 y.o. female who presents to the clinic as she has been experiencing significant stress recently.  She states that she has been very tired and sleepy in general.  She feels foggy.  She states that she has felt very stressed as result of work.  She states that one individual left another has been on vacation and as result she has been doing the work of 3 people.  This has been very difficult for her.  She has felt exhausted.  Her  "concentration has been poor.  She has been feeling very depressed and has had difficulty with sleep.  Her energy level is often she feels very badly.  She has had little interest or pleasure in doing things.  She has had thoughts of self-harm as well.  Also, she has been feeling extremely anxious and has not been able to control her worrying.  She is experiencing these symptoms on a daily basis.  She has been significantly fatigued.  Her energy level has been depleted as noted.  She has had a mild headache with some pain on the left side of her head and involving the posterior neck area.  She is  and has good spousal support.  She is a history of type 2 diabetes mellitus as well.    The following portions of the patient's history were reviewed and updated as appropriate: allergies, current medications, past family history, past medical history, past social history, past surgical history and problem list.    Review of Systems   A 12 point comprehensive review of systems was negative except as noted.      Current Outpatient Prescriptions   Medication Sig Dispense Refill     atorvastatin (LIPITOR) 20 MG tablet Take 20 mg by mouth.       blood glucose test (ONETOUCH VERIO) strips Test 5 times per day.       glipiZIDE (GLUCOTROL XL) 5 MG 24 hr tablet Take 10 mg by mouth daily.       metFORMIN (GLUMETZA) 500 MG (MOD) 24 hr tablet Take 500 mg by mouth 2 (two) times a day with meals.       nicotine polacrilex (COMMIT) 4 MG lozenge Apply 4 mg to the mouth or throat as needed for smoking cessation.       ONETOUCH DELICA LANCETS 30 gauge Misc        sertraline (ZOLOFT) 50 MG tablet Take 50 mg by mouth.       sertraline (ZOLOFT) 100 MG tablet Take 1 tablet (100 mg total) by mouth daily. 90 tablet 3     No current facility-administered medications for this visit.        Objective:      /70  Pulse 64  Temp 98.1  F (36.7  C) (Oral)   Resp 16  Ht 5' 7\" (1.702 m)  Wt 171 lb (77.6 kg)  LMP  (LMP Unknown)  BMI " 26.78 kg/m2    Appears  General appearance: alert, appears stated age and cooperative  Sad.  Affect is flat  Head: Normocephalic, without obvious abnormality, atraumatic  Neurologic: Alert and oriented X 3, normal strength and tone.   Normal coordination and gait         Recent Results (from the past 168 hour(s))   Basic Metabolic Panel   Result Value Ref Range    Sodium 143 136 - 145 mmol/L    Potassium 3.3 (L) 3.5 - 5.0 mmol/L    Chloride 106 98 - 107 mmol/L    CO2 28 22 - 31 mmol/L    Anion Gap, Calculation 9 5 - 18 mmol/L    Glucose 181 (H) 70 - 125 mg/dL    Calcium 9.0 8.5 - 10.5 mg/dL    BUN 12 8 - 22 mg/dL    Creatinine 0.85 0.60 - 1.10 mg/dL    GFR MDRD Af Amer >60 >60 mL/min/1.73m2    GFR MDRD Non Af Amer >60 >60 mL/min/1.73m2   Glycosylated Hemoglobin A1c   Result Value Ref Range    Hemoglobin A1c 7.7 (H) 3.5 - 6.0 %   HM2(CBC w/o Differential)   Result Value Ref Range    WBC 7.7 4.0 - 11.0 thou/uL    RBC 4.42 3.80 - 5.40 mill/uL    Hemoglobin 13.6 12.0 - 16.0 g/dL    Hematocrit 40.2 35.0 - 47.0 %    MCV 91 80 - 100 fL    MCH 30.8 27.0 - 34.0 pg    MCHC 33.8 32.0 - 36.0 g/dL    RDW 11.8 11.0 - 14.5 %    Platelets 244 140 - 440 thou/uL    MPV 7.8 7.0 - 10.0 fL   Thyroid Cascade   Result Value Ref Range    TSH 2.42 0.30 - 5.00 uIU/mL   Hepatic Profile   Result Value Ref Range    Bilirubin, Total 0.7 0.0 - 1.0 mg/dL    Bilirubin, Direct 0.2 <=0.5 mg/dL    Protein, Total 6.6 6.0 - 8.0 g/dL    Albumin 3.8 3.5 - 5.0 g/dL    Alkaline Phosphatase 61 45 - 120 U/L    AST 18 0 - 40 U/L    ALT 18 0 - 45 U/L   LDL Cholesterol, Direct   Result Value Ref Range    Direct LDL 87 <=129 mg/dl          This note has been dictated using voice recognition software. Any grammatical or context distortions are unintentional and inherent to the software

## 2021-06-12 NOTE — PROGRESS NOTES
"The patient has been notified of the following:      \"We have found that certain health care needs can be provided without the need for a face to face visit.  This service lets us provide the care you need with a phone conversation.       I will have full access to your [facility] medical record during this entire phone call.   I will be taking notes for your medical record.      Since this is like an office visit, we will bill your insurance company for this service.       There are potential benefits and risks of telephone visits (e.g. limits to patient confidentiality) that differ from in-person visits.?  Confidentiality still applies for telephone services, and nobody will record the visit.  It is important to be in a quiet, private space that is free of distractions (including cell phone or other devices) during the visit.??      If during the course of the call I believe a telephone visit is not appropriate, you will not be charged for this service\"     Consent has been obtained for this service by care team member: Called by the clinical assistant.    Total time: 8 minutes    Assessment/ Plan     1. Anxiety    - sertraline (ZOLOFT) 50 MG tablet; Take 1 tablet (50 mg total) by mouth daily.  Dispense: 90 tablet; Refill: 2    2. Lymphocytic colitis        Subjective:       Deyanira Kelley is a 55 y.o. female who presents for       The following portions of the patient's history were reviewed and updated as appropriate: allergies, current medications, past family history, past medical history, past social history, past surgical history and problem list. Medications have been reconciled    Review of Systems   A 12 point comprehensive review of systems was negative except as noted.      Current Outpatient Medications   Medication Sig Dispense Refill     ACETONE, URINE, TEST strip        atorvastatin (LIPITOR) 20 MG tablet Take 20 mg by mouth daily.        blood glucose test (ONETOUCH VERIO) strips Test 5 times per day.  "      HUMALOG 100 unit/mL injection        HUMALOG KWIKPEN INSULIN 100 unit/mL pen        insulin pump controller Misc As directed.       metFORMIN (GLUCOPHAGE-XR) 500 MG 24 hr tablet Take 2,000 mg by mouth daily.        nicotine, polacrilex, (NICORETTE) 2 mg lozenge Apply 2 mg to cheek.       OMNIPOD DASH 5 PACK POD U UTD FOR CONTINUOUS INSULIN DELIVERY - CHANGE EVERY 2 DAYS       ONETOUCH DELICA LANCETS 30 gauge Misc        potassium chloride (K-DUR,KLOR-CON) 20 MEQ tablet        sertraline (ZOLOFT) 50 MG tablet Take 1 tablet (50 mg total) by mouth daily. 90 tablet 2     No current facility-administered medications for this visit.        Objective:      LMP  (LMP Unknown)     Examination not performed as this is a telephone visit  General: Does not sound acutely distressed  There is no audible wheezing  Speech is clear  Judgment and insight are intact    No results found for this or any previous visit (from the past 168 hour(s)).       This note has been dictated using voice recognition software. Any grammatical or context distortions are unintentional and inherent to the software    Israel Ng MD

## 2021-06-13 NOTE — PROGRESS NOTES
"DIAGNOSIS:  1. Urinary frequency  Urinalysis-UC if Indicated   2. Hypokalemia  Basic Metabolic Panel       PLAN:  Patient Instructions   CIPRO 500 MG TWICE DAILY FOR 7 DAYS    UC SENT    PUSH FLUIDS    FOLLOW UP IF SXS NOT GONE IN 48-72 HRS OR IF ANY FEVER OR CHILLS DEVELOP    RECHECK POTASSIUM TODAY            HPI: LAST NIGHT WAS ONLY ABLE TO PASS A FEW DROPS OF URINE AT A TIME AND NOTED BLOOD ON WIPING.   URGENCY. SOME LOSS OF URINE CONTROL DURING THE NIGHT.  SLIGHT BURNING.   MOSTLY HAS PAIN POST-VOID, \" A DEEP LOWER PAIN\".  NO FEVER OR CHILLS.  NO DISCRETE BACKPAIN.  NO H/O RECENT UTI  LAST SI 5-7 DAYS AGO          Current Outpatient Prescriptions on File Prior to Visit   Medication Sig Dispense Refill     aspirin 81 MG EC tablet Take 1 tablet (81 mg total) by mouth daily.  0     atorvastatin (LIPITOR) 20 MG tablet Take 20 mg by mouth.       blood glucose test (ONETOUCH VERIO) strips Test 5 times per day.       glipiZIDE (GLUCOTROL XL) 5 MG 24 hr tablet Take 10 mg by mouth daily.       metFORMIN (GLUMETZA) 500 MG (MOD) 24 hr tablet Take 500 mg by mouth 2 (two) times a day with meals.       nicotine polacrilex (COMMIT) 4 MG lozenge Apply 4 mg to the mouth or throat as needed for smoking cessation.       ONETOUCH DELICA LANCETS 30 gauge Misc        sertraline (ZOLOFT) 100 MG tablet Take 1 tablet (100 mg total) by mouth daily. 90 tablet 3     No current facility-administered medications on file prior to visit.        Pmh: reviewed  Psh: reviewed  Allergy:  reviewed      EXAM:    /70 (Patient Site: Right Arm, Patient Position: Sitting, Cuff Size: Adult Regular)  Pulse 76  Temp 98.5  F (36.9  C) (Oral)   Resp 18  Wt 166 lb 4.8 oz (75.4 kg)  LMP  (LMP Unknown)  BMI 26.05 kg/m2  GEN:   ALERT, NAD, ORIENTED TIMES THREE  NECK: SUPPLE WITHOUT ADENOPATHY OR THYROMEGALY  LUNGS: CTA  COR: RRR WITHOUT MURMUR  ABD: SOFT, +BS, NONTX WITHOUT GUARDING OR PERITONEAL SIGNS,  MILD SUPRAPUBIC TX  SKIN: NO RASH , ULCERS OR " LESIONS NOTED  EXT: WITHOUT EDEMA OR SWELLING    Recent Results (from the past 168 hour(s))   Urinalysis-UC if Indicated    Collection Time: 10/03/17 11:25 AM   Result Value Ref Range    Color, UA Yellow Colorless, Yellow, Straw, Light Yellow    Clarity, UA Cloudy (!) Clear    Glucose, UA Negative Negative    Bilirubin, UA Negative Negative    Ketones, UA Negative Negative    Specific Gravity, UA 1.025 1.005 - 1.030    Blood, UA Large (!) Negative    pH, UA 6.5 5.0 - 8.0    Protein,  mg/dL (!) Negative mg/dL    Urobilinogen, UA 0.2 E.U./dL 0.2 E.U./dL, 1.0 E.U./dL    Nitrite, UA Negative Negative    Leukocytes, UA Small (!) Negative     Results for orders placed or performed in visit on 08/28/17   Basic Metabolic Panel   Result Value Ref Range    Sodium 143 136 - 145 mmol/L    Potassium 3.3 (L) 3.5 - 5.0 mmol/L    Chloride 106 98 - 107 mmol/L    CO2 28 22 - 31 mmol/L    Anion Gap, Calculation 9 5 - 18 mmol/L    Glucose 181 (H) 70 - 125 mg/dL    Calcium 9.0 8.5 - 10.5 mg/dL    BUN 12 8 - 22 mg/dL    Creatinine 0.85 0.60 - 1.10 mg/dL    GFR MDRD Af Amer >60 >60 mL/min/1.73m2    GFR MDRD Non Af Amer >60 >60 mL/min/1.73m2

## 2021-06-14 NOTE — PROGRESS NOTES
Chief Complaint   Patient presents with     Follow-up     f/up from 12/10. hx of colitis         HPI:   Deyanira Kelley is a 52 y.o. female c/o lower abdominal pain for the last 6 days.  Usually in the pelvic area, has moved to the right side at times.  Pain is worse after she eats.  Has lymphocytic colitis and normally has loose stools.  Stools look the same, but she is passing less.  No blood or mucous in stools.  No fever.  No nausea or vomiting.  Has been eating, but appetite is down.    Usually sees remains of her pills in the stools but now isn't  Eating habits haven't changed.    Hasn't seen GI in 4-5 years after her diagnosis of lymphocytic colitis.    No medications tried.    Was seen in ER 4 days ago at [facility].  She had some blood tests.  Was found to be hypokalemic.  She was also given a shot of Toradol which she states did not help with her pain.  They did not find a specific etiology for her pain.  She did have a pelvic exam and rectal exam there.    ROS:  Constitutional: As per HPI  Eyes: Negative  ENT: No ear pain sore throat or nasal stuffiness  Respiratory: No cough or respiratory distress  CV: No chest pain  GI: As per HPI  : No dysuria or hematuria  SKIN: No rashes  MS: No myalgias  NEURO: No headache numbness or weakness.  ENDO: Has diabetes.  Does not check blood sugars daily.  But her blood sugars when she has checked them have been running as usual which is around 200.  Has not had any hypoglycemic episodes.      Medications:  Current Outpatient Prescriptions on File Prior to Visit   Medication Sig Dispense Refill     aspirin 81 MG EC tablet Take 1 tablet (81 mg total) by mouth daily.  0     atorvastatin (LIPITOR) 20 MG tablet Take 20 mg by mouth.       blood glucose test (ONETOUCH VERIO) strips Test 5 times per day.       glipiZIDE (GLUCOTROL XL) 5 MG 24 hr tablet Take 10 mg by mouth daily.       nicotine polacrilex (COMMIT) 4 MG lozenge Apply 4 mg to the mouth or throat as needed  for smoking cessation.       ONETOUCH DELICA LANCETS 30 gauge Misc        potassium chloride SA (K-DUR,KLOR-CON) 10 MEQ tablet Take three po qday and recheck potassium as advised 60 tablet 1     sertraline (ZOLOFT) 100 MG tablet Take 1 tablet (100 mg total) by mouth daily. 90 tablet 3     [DISCONTINUED] ciprofloxacin HCl (CIPRO) 500 MG tablet 500 MG ORALLY TWICE DAILY FOR 7 DAYS 14 tablet 0     [DISCONTINUED] metFORMIN (GLUMETZA) 500 MG (MOD) 24 hr tablet Take 500 mg by mouth 2 (two) times a day with meals.       No current facility-administered medications on file prior to visit.          Social History:  Social History   Substance Use Topics     Smoking status: Former Smoker     Smokeless tobacco: Not on file     Alcohol use Not on file         Physical Exam:   Vitals:    12/14/17 1355   BP: 126/76   Pulse: 77   Temp: 98.2  F (36.8  C)   SpO2: 98%   Weight: 171 lb 12.8 oz (77.9 kg)       GENERAL:   Alert. Oriented.  EYES: Clear  HENT:  Ears: R TM pearly gray. Normal landmarks. L TM pearly gray.  Normal landmarks  Nose: Clear.  Sinuses: Nontender.  Oropharynx:  No erythema. No exudate.  NECK: Supple. No adenopathy.  LUNGS: Clear to ascultation.  No crackles. Normal symmetric air entry throughout both lung fields. No chest wall deformities or tenderness.  HEART: S1 and S2 normal, no murmurs, clicks, gallops or rubs. Regular rate and rhythm. . No JVD.   SKIN:  No rash.  ABDOMEN:  +BS. No tenderness. Soft, no guarding, rebound, rigidity,mass, or organomegaly. No CVA tenderness    MS: without edema      CHART REVIEW  DATE/place of visit: 12/10/17; [facility]  DX: Pelvic pain  TESTS: EKG essentially normal: UA few bacteria; potassium 3; glucose 209; calcium 8; CBC normal,: Lipase normal  TREATMENT: Toradol    Assessment/Plan:    1. Abdominal pain  HM1(CBC and Differential)    Comprehensive Metabolic Panel    HM1 (CBC with Diff)   2. Constipation       Description of pain is consistent with  constipation.  Discussed this with the patient.  Labs to be drawn as ordered above.  Discussed using some mag citrate for constipation and Dulcolax.  Also discussed getting a CT scan.  We opted to use the laxatives.  If she does not improve then we will plan to get a CT scan.  With an essentially negative abdominal exam bowel obstruction is less likely.  She does not have any peritoneal signs suggestive of a surgical problem.  If she is worsening significantly she needs to be examined promptly.  I did recommend that she check her blood sugars daily.  She should notify her endocrinologist if her blood sugars are getting very high or very low.  She will call me tomorrow to let me know how she will is feeling and how what results she had from the laxatives.  We will plan to make a final determination about the CT at that time.      The following portions of the patient's history were reviewed and updated as appropriate: allergies, current medications, past family history, past medical history, past social history, past surgical history and problem list.    Loida Pablo MD      12/14/2017

## 2021-06-16 PROBLEM — R19.7 DIARRHEA: Status: ACTIVE | Noted: 2018-11-01

## 2021-06-16 PROBLEM — F32.9 MAJOR DEPRESSION: Status: ACTIVE | Noted: 2017-08-28

## 2021-06-16 PROBLEM — F41.9 ANXIETY: Status: ACTIVE | Noted: 2017-08-28

## 2021-06-18 NOTE — PROGRESS NOTES
Dear  Getachew Lopez Md  [address]  [address]  [place] [address]    Thank you for the opportunity to participate in the care of  Deyanira Kelley.    She is a 53 y.o. y/o who comes to the clinic due to snoring, tiredness, trouble falling asleep.     The patient reports snoring that started approximately 2 years ago. The snoring is loud but it has not reached the level of being disruptive to her .  The patient has a  bed partner that confirms the snoring. The snoring happens every night .  She thinks that the snoring has been getting worse over time.     Associated symptoms:  Morning Headache:no  Dry mouth:yes  Witnessed apneas:no  Daytime sleepiness:yes  Seasonal allergies: yes  History of orthodontic treatment: yes.    During the day she reports that she is very tired. Her tiredness has been present for at least 2 years and it is present every day. Increasing her sleep time did not help with her tiredness. She feels that her tiredness is the same during the day.    The patient also reports difficulties to fall asleep that are present once per week. Certain days, she goes to bed at her usual time and she is unable to fall asleep for 2 or 3 hours. She has not been able to identify anything that could be causing her difficulties to fall asleep. She recalls having difficulties to fall asleep for many years but this was related to her 's snoring; she started using ear plugs to be able to sleep and this helped. Her  is no longer snoring because he has a CPAP device but she continues to have some difficulties to fall asleep.     STOP BANG 6/15/2018   Do you snore loudly (louder than talking or loud enough to be heard through closed doors)? 1   Do you often feel tired, fatigued, or sleepy during daytime? 1   Has anyone observed you stop breathing in your sleep? 0   Do you have or are you being treated for high blood pressure? 0   BMI more than 35 kg/m2 0   Age over 50 years old? 1   Neck  circumference greater than 16 inches? 0   Gender male? 0   Total Score 3   Epworths Sleepiness Scale 6/15/2018   Sitting and reading 3   Watching TV 2   Sitting, inactive in a public place (e.g. a theatre or a meeting) 2   As a passenger in a car for an hour without a break 1   Lying down to rest in the afternoon when circumstances permit 3   Sitting and talking to someone 2   Sitting quietly after a lunch without alcohol 2   In a car, while stopped for a few minutes in traffic 1   Total score 16   Rooming 6/15/2018   Usual bedtime 10PM   Sleep Latency VARIES   Awakenings 2-3 TIMES   Wake Up Time 5:30AM   Weekends VARIES   Energy Drinks OCCASSIONALLY 1 PER WEEK   Coffee OCCASIONALLY 1 PER WEEK   Cola Dt. Mountain Dew 6 qd   Difficulty falling asleep Yes   Difficulty staying asleep No   Excessive daytime tiredness Yes   Excessive daytime sleepiness Yes   Dozing off while driving No   Shift Worker No   Sleep Walking? No       Past Medical History  Past Medical History:   Diagnosis Date     Diabetes mellitus (H)      Patient Active Problem List   Diagnosis     Hyperlipidemia     Rectocele     Urge incontinence     Major depression     Anxiety     Lymphocytic colitis     Type 2 diabetes mellitus without complication (H)     Obesity          Past Surgical History  Past Surgical History:   Procedure Laterality Date     HYSTERECTOMY       RI REMOVAL OF OVARY(S)      Description: Oophorectomy;  Recorded: 12/19/2007;  Comments: '89     RI VAG HYST,RMV VAG,FIX ENTEROCELE      Description: Vaginal Hysterectomy With Colpectomy & Enterocele Repair;  Recorded: 12/19/2007;  Comments: 11/07 [facility]        Meds  Current Outpatient Prescriptions   Medication Sig Dispense Refill     aspirin 81 MG EC tablet Take 1 tablet (81 mg total) by mouth daily.  0     atorvastatin (LIPITOR) 20 MG tablet Take 20 mg by mouth.       blood glucose test (ONETOUCH VERIO) strips Test 5 times per day.       glipiZIDE (GLUCOTROL XL) 2.5 MG  "24 hr tablet Take 15 mg by mouth.       metFORMIN (GLUCOPHAGE-XR) 500 MG 24 hr tablet Take 2,000 mg by mouth.       nicotine, polacrilex, (NICORETTE) 2 mg lozenge Apply 2 mg to cheek.       ONETOUCH DELICA LANCETS 30 gauge Misc        potassium chloride SA (K-DUR,KLOR-CON) 10 MEQ tablet Take three po qday and recheck potassium as advised 60 tablet 1     sertraline (ZOLOFT) 100 MG tablet Take 1 tablet (100 mg total) by mouth daily. 90 tablet 3     No current facility-administered medications for this visit.    Lantus 20 units every day.     Allergies  Oxycodone-acetaminophen and Empagliflozin     Social History  Social History     Social History     Marital status:      Spouse name: N/A     Number of children: N/A     Years of education: N/A     Occupational History     Not on file.     Social History Main Topics     Smoking status: Former Smoker     Smokeless tobacco: Not on file     Alcohol use Not on file     Drug use: Not on file     Sexual activity: Not on file     Other Topics Concern     Not on file     Social History Narrative        Family History  Family History   Problem Relation Age of Onset     Breast cancer Neg Hx            Review of Systems:  Constitutional: Negative except as noted in HPI.   Eyes: Negative except as noted in HPI.   ENT: Negative except as noted in HPI.   Cardiovascular: Negative except as noted in HPI.   Respiratory: Negative except as noted in HPI.   Gastrointestinal: Negative except as noted in HPI.   Genitourinary: Negative except as noted in HPI.   Musculoskeletal: Negative except as noted in HPI.   Integumentary: Negative except as noted in HPI.   Neurological: Negative except as noted in HPI.   Psychiatric: Negative except as noted in HPI.   Endocrine: Negative except as noted in HPI.   Hematologic/Lymphatic: Negative except as noted in HPI.      Physical Exam:  /72  Pulse 70  Ht 5' 7\" (1.702 m)  Wt 176 lb (79.8 kg)  LMP  (LMP Unknown)  SpO2 98%  BMI 27.57 " kg/m2  BMI:Body mass index is 27.57 kg/(m^2).   GEN: NAD  Head: Normocephalic.  EYES: PERRLA, EOMI    ENT:  Ears  Lesions no, Abnormal appearance or position: no, Mastoid process area redness: no  Canal:   Discharge: no, Swelling: no, Redness: yes, Wax: no, foreign bodies: no    Tenderness over tragus or on manipulation of the pinna: no  Tenderness on tapping of mastoid process: no  Pre, post auricular and occipital nodes: no    Nose.    External Inspection:  Inflammation: no, Deformity: no,Discharge: no, Bleeding: no    Internal Inspection:   Color: pink, Edema: no, Deviated or perforated septum:no, Polyps: no, Bleeding: no, Breathing pattern: nasal.  Dynamic collapse with forced inspiration: no    Mouth and Throat  Inspection  Lips: lesions no;  symmetry normal  Oral cavity: breath odor : normal, color: pink , lesions of buccal mucosa: no (exam is limited by presence of white deposits from a nicotine tablet)  Teeth and gums: redness: no , swelling: no  No misti.  Throat and pharynx: color: pink, exudates: no uvula: midline, not edematous.   Mallapmati class: I    Neck:  Inspection  Symmetry: symetric, Swelling:no  Thyroid is  Not palpable    CV: Regular rate and rhythm, S1 & S2 are normal. No murmurs  LUNGS: clear to auscultation bilaterally, no wheezes  ABDOMEN: positive bowel sounds, soft, no rebound or guarding  MUSCULOSKELETAL: no clubbing, cyanosis or edema  SKIN: warm, dry, no rashes  Neurological: Alert, oriented to time, place, and person.  Psych:  normal mood, normal affect     Labs/Studies:     Lab Results   Component Value Date    WBC 9.1 12/14/2017    HGB 13.9 12/14/2017    HCT 41.4 12/14/2017    MCV 90 12/14/2017     12/14/2017         Chemistry        Component Value Date/Time     12/14/2017 1448    K 3.4 (L) 12/14/2017 1448     12/14/2017 1448    CO2 25 12/14/2017 1448    BUN 13 12/14/2017 1448    CREATININE 0.79 12/14/2017 1448     (H) 12/14/2017 1448        Component Value  Date/Time    CALCIUM 8.9 12/14/2017 1448    ALKPHOS 63 12/14/2017 1448    AST 13 12/14/2017 1448    ALT 14 12/14/2017 1448    BILITOT 0.5 12/14/2017 1448            No results found for: FERRITIN  Lab Results   Component Value Date    TSH 2.42 08/28/2017     Lab Results   Component Value Date    HGBA1C 7.7 (H) 08/28/2017           Assessment and Plan:  In summary Deyanira Kelley is a 53 y.o. year old female here for consultation.    1. Snoring/ daytime sleepiness/ risk for sleep apnea  Deyanira Kelley has high risk for obstructive sleep apnea based on the results of her STOP BANG questionnaire and ESS.  The patient has a low Mallampati scale.  Recommend getting an overnight polysomnography to split per protocol.  She could be an excellent candidate for MAD therapy but she has a history of orthodontic treatment and it would beneficial to complete a detailed dental examination if she decides to purse that form of therapy.  The patient should return to the clinic to discuss results and treatment option in a patient-centered approach.    2.Hypersomnia.  The patient certainly has high risk for sleep-disordered breathing but her sleepiness could be multifactorial.  Other potential causes of sleepiness could be her diabetes and her history of depression.  Recommend avoiding driving or operating any machinery if she is too sleepy.    3. Difficulties falling asleep.  Low frequency.  Recommend keeping a log in order to identify possible etiologies for this problem.  I do not recommend any pharmacological intervention at this point.       Patient verbalized understanding of these issues, agrees with the plan and all questions were answered today. Patient was given an opportuntity to voice any other symptoms or concerns not listed above. Patient did not have any other symptoms or concerns.       MD ASHLI Mederos Board Certified in Internal Medicine and Sleep Medicine  [facility].

## 2021-06-19 NOTE — PROGRESS NOTES
Assessment:     Diagnoses and all orders for this visit:    Acute right-sided low back pain with right-sided sciatica    Facet arthropathy, lumbosacral    SI (sacroiliac) joint dysfunction     Deyanira Kelley is a 53 y.o. y.o. female with past medical history significant for hyperlipidemia, type 2 diabetes, urge incontinence -with previous bladder sling surgery, major depression, anxiety, obesity, lymphocytic colitis who presents today for follow-up regarding:    -Previously acute right-sided low back pain with some right leg pain in which the right leg pain has resolved, right low back pain very minimal, significantly improved with physical therapy.  Right-sided low back pain likely combination of facet mediated type pain from the right L4-5 facet joint which shows some inflammation as well as SI joint dysfunction.  Therapy had noted a right ileal/anterior rotation of the SI joint that has improved with manipulation/therapy.      Patient is neurologically intact on exam.     Plan:     A shared decision making plan was used. The patient's values and choices were respected. Prior medical records from 7/12/18 to current were reviewed today. The following represents what was discussed and decided upon by the provider and the patient.        -DIAGNOSTIC TESTS: Images were personally reviewed and interpreted.   --Lumbar spine MRI 7/13/2018 does reveal L4-5 lateral disc bulge with annular tear with mild bilateral foraminal stenosis.  There is also moderate to advanced facet arthropathy at this level and edema right L4 and L5 pedicles with inflammatory change in the right L4-5 facet joint.  --Lumbar spine CT scan 7/11/2018 shows mild disc height loss at L3-4 and disc bulge at L4-5 with mild central canal stenosis.  No foraminal stenosis noted.  Degenerative facet sclerosis noted at L4 through S1.    -INTERVENTIONS: Discussed if her pain does worsen down the road we could consider a right L4-5 facet joint steroid injection  versus right SI joint steroid injection depending on her pain.  Patient is doing well at this time therefore will hold off on any injections.      -MEDICATIONS: Advised patient continue diclofenac only as needed for pain at this time.   Discussed side effects of medications and proper use. Patient verbalized understanding.    -PHYSICAL THERAPY: Advised patient continue with physical therapy sessions at this time as well as home exercises on a regular basis.  Did discuss with patient that I do not have any limitations for her in regards to her pain or her spine findings.  Recommend slowly getting back into normal activities, would recommend holding off on diving with her beach volleyball, okay to water ski however would recommend starting a shorter increments as tolerated.  Discussed the importance of core strengthening, ROM, stretching exercises with the patient and how each of these entities is important in decreasing pain.  Explained to the patient that the purpose of physical therapy is to teach the patient a home exercise program.  These exercises need to be performed every day in order to decrease pain and prevent future occurrences of pain.        -PATIENT EDUCATION:  25 minutes of total visit time was spent face to face with the patient today, 60 % of the visit was spent on counseling, education, and coordinating care.   -5 minutes spent outside of visit time, non-face-to-face time, reviewing chart.    -FOLLOW UP: Follow-up as needed.  Advised to contact clinic if symptoms worsen or change.    Subjective:     Deyanira Kelley is a 53 y.o. female who presents today for follow-up regarding previously significant right-sided low back pain that was radiating into the right buttock and right leg nonspecific pattern that has significantly improved, leg pain has resolved with physical therapy.  She has some remaining mild right-sided low back pain that is currently a 1/10, very minimal and mild she reports.  Patient  denies any left leg symptoms, denies weakness or recent trips or falls.  Patient denies any bowel or bladder dysfunction.    Patient is very active getting back into volleyball as well as [employer] and wants to know if she has any limitations in activity.    -Treatment to Date: No prior spinal surgery or spinal injection.  Physical therapy ×6 sessions 8/9/2018 for right lumbar radiculitis/SI joint dysfunction with significant benefit.     -Medications:  Diclofenac oral medication with minimal benefit, has only taken one dose however.  Diclofenac gel-has not started  Tizanidine     **Zoloft SSRI    Patient Active Problem List   Diagnosis     Hyperlipidemia     Rectocele     Urge incontinence     Major depression     Anxiety     Lymphocytic colitis     Type 2 diabetes mellitus without complication (H)     Obesity       Current Outpatient Prescriptions on File Prior to Encounter   Medication Sig Dispense Refill     aspirin 81 MG EC tablet Take 1 tablet (81 mg total) by mouth daily.  0     atorvastatin (LIPITOR) 20 MG tablet Take 20 mg by mouth daily.        blood glucose test (ONETOUCH VERIO) strips Test 5 times per day.       budesonide (ENTOCORT EC) 3 mg 24 hr capsule Take 9 mg by mouth daily.        glipiZIDE (GLUCOTROL XL) 2.5 MG 24 hr tablet Take 15 mg by mouth.       insulin glargine (LANTUS SOLOSTAR U-100 INSULIN) 100 unit/mL (3 mL) pen Inject 20 Units under the skin every morning.        metFORMIN (GLUCOPHAGE-XR) 500 MG 24 hr tablet Take 2,000 mg by mouth daily.        nicotine, polacrilex, (NICORETTE) 2 mg lozenge Apply 2 mg to cheek.       ONETOUCH DELICA LANCETS 30 gauge Misc        potassium chloride SA (K-DUR,KLOR-CON) 10 MEQ tablet Take three po qday and recheck potassium as advised 60 tablet 1     sertraline (ZOLOFT) 100 MG tablet Take 1 tablet (100 mg total) by mouth daily. 90 tablet 3     tiZANidine (ZANAFLEX) 2 MG tablet Take 1-2 tablets (2-4 mg total) by mouth every 8 (eight) hours as needed. 28  tablet 0     No current facility-administered medications on file prior to encounter.        Allergies   Allergen Reactions     Oxycodone-Acetaminophen Itching     Empagliflozin Rash       Past Medical History:   Diagnosis Date     Anxiety      Depression      Diabetes mellitus (H)         Review of Systems  ROS:  Specifically negative for bowel/bladder dysfunction, balance changes, headache, dizziness, foot drop, fevers, chills, appetite changes, nausea/vomiting, unexplained weight loss. Otherwise 13 systems reviewed are negative. Please see the patient's intake questionnaire from today for details.    Reviewed Social, Family, Past Medical and Past Surgical history with patient, no significant changes noted since prior visit.     Objective:     /72 (Patient Site: Right Arm, Patient Position: Sitting)  Pulse 79  Temp 98.4  F (36.9  C) (Oral)   Wt 173 lb (78.5 kg)  LMP  (LMP Unknown)  SpO2 98%  BMI 27.1 kg/m2    PHYSICAL EXAMINATION:    --CONSTITUTIONAL: Well developed, well nourished, healthy appearing individual.  --PSYCHIATRIC: Appropriate mood and affect. No difficulty interacting due to temper, social withdrawal, or memory issues.  --SKIN: Lumbar region is dry and intact.   --RESPIRATORY: Normal rhythm and effort. No abnormal accessory muscle breathing patterns noted.   --MUSCULOSKELETAL:  Normal lumbar lordosis noted, no lateral shift.  --GROSS MOTOR: Easily arises from a seated position. Gait is non-antalgic  --LUMBAR SPINE:  Inspection reveals no evidence of deformity.     RESULTS:   Imaging: Lumbar spine imaging was reviewed today. The images were shown to the patient and the findings were explained using a spine model.      MRI LUMBAR SPINE W/O CONTRAST  7/13/2018   INDICATION: Low back pain and right lower extremity symptoms.  TECHNIQUE: Without IV contrast.  CONTRAST: None.  COMPARISON: None.  FINDINGS: Nomenclature is based on 5 lumbar-type vertebral bodies. Mild edema  noted in the right L4  and right L5 bony pedicles likely reactive or degenerative  in nature. No pars defect. The conus tip is identified at L1. Grossly normal  paraspinal soft tissues. No abdominal aortic aneurysm. The visualized portions  of the bony pelvis are normal for age.  T12-L1: Normal disc height and signal. No herniation. No facet arthropathy. No  spinal canal stenosis. No right neural foraminal stenosis. No left neural  foraminal stenosis.  L1-L2: Normal disc height and signal. No herniation. No facet arthropathy. No  spinal canal stenosis. No right neural foraminal stenosis. No left neural  foraminal stenosis.  L2-L3: Normal disc height and signal. No herniation. No facet arthropathy. No  spinal canal stenosis. No right neural foraminal stenosis. No left neural foraminal stenosis.  L3-L4: Minimal loss of disc height and signal with minimal disc bulging. Mild  facet arthropathy. No spinal canal stenosis. No right neural foraminal stenosis.  No left neural foraminal stenosis.  L4-L5: Slight loss of disc height and signal with shallow disc bulging and a  tiny right posterolateral annular tear. Tiny lateral disc protrusion on the  left. Moderate to advanced facet arthropathy. No spinal canal stenosis. Mild  right neural foraminal stenosis. Mild left neural foraminal stenosis.  L5-S1: Normal disc height and signal. No herniation. Moderate facet arthropathy.  No spinal canal stenosis. No right neural foraminal stenosis. No left neural  foraminal stenosis.  CONCLUSION:  1.  No high-grade central canal narrowing in the lumbar region.  2.  No right-sided disc herniation or severe right-sided neural foraminal narrowing.  3.  At the L4-L5 level, there is lateral disc bulging and spurring on both sides  resulting in mild narrowing of both L4-L5 neural foramen.  4.  At the L4-L5 level, there is moderate to advanced facet arthropathy. This is  slightly greater on the right. There is mild bone edema in the right L4 and  right L5 pedicles  noted on the STIR sequence with some slight inflammatory  change about the right L4-L5 facet. Presumably these changes are all reactive  and degenerative in nature.        CT LUMBAR SPINE W/O CONTRAST  7/11/2018 - [place]  HISTORY: Low back pain.  COMPARISON: None.  FINDINGS: There is no acute fracture or malalignment. There is mild disc height loss at L3-L4. Mild left paracentral disc bulge. There is mild diffuse disc bulge at L4-L5. There is mild spinal stenosis. There is no significant foraminal stenosis. There is degenerative facet sclerosis from L4-S1. The paraspinal soft tissues are unremarkable.  IMPRESSION: No acute abnormality. Mild multilevel degenerative  disease.

## 2021-06-19 NOTE — PROGRESS NOTES
New patient evaluation   --ED yesterday 7/11/18 [facility] for back pain  --Went to [facility] today for back pain as well  --Pt reports of having ongoing lumbar pain on and off since 1/2018  --She has been getting flare ups on and off but eventually goes away on its own   --Yesterday C/O severe right low back pain with shooting pain down the right leg per pt  --Continue to have right low back pain today but no longer having leg pain  --Rates pain 5/10 now  --No hx of spine surgery, injections, PT, chiro  --CT lumbar spine 7/11/18 [facility], EDELMIRA signed.     Medication  --7/11/18 ED Rx Diclofenac 75 mg 1 tab two times a day PRN, with no relief per pt  --ED Rx Oxycodone 5 mg 7/11/18 which she did not get due to allergy to Percocet per pt  --Got new Rx today Diclofenac gel which she has not picked up yet

## 2021-06-19 NOTE — PROGRESS NOTES
[facility] Daily Progress     Patient Name: Deyanira Kelley  Date: 7/27/2018  Visit #: 3  PTA visit #:  NA  Referral Diagnosis: Lumbalgia  Referring provider: Ketty Moscoso PA-C  Visit Diagnosis:     ICD-10-CM    1. SI (sacroiliac) joint dysfunction M53.3    2. Right lumbar radiculitis M54.16    3. Muscle weakness (generalized) M62.81      Deyanira Kelley is a 53 y.o. female who presents to therapy today with chief complaints of right low back pain with radiation into her R leg. Onset date of sx was January 2018 with worsening symptoms 7/11/18.  Pt reported h/o hypercholesterolemia, type 2 diabetes, hyperlipidemia, major depression, anxiety, obesity, and lymphocitic colitis.  Pain symptoms are dull in nature with episodes of sharp pain.  She has not had LE symptoms since her ED visit on 7/11/18.  Functional impairments include recreational activities and housework.  Pt demo's signs and sx consistent with SI joint dysfunction with likely R anterior ilial rotation and L4/L5 lumbar radiculitis with facet pathology.     Precautions / Restrictions : None    Assessment:     HEP/POC compliance is  good .  The patient reports improvements in her pain level after treatment today.  She is able to self-correct pelvic alignment with anterior rotation MET to R side.  She is progressing and appropriate to continue with PT services at this time.    Goal Status:  Pt. will be independent with home exercise program in : 6 weeks;Progressing toward  Pt will: be able to do recreational activities without pain; in 8 weeks; progressing toward  Pt will: be able to perform gardening and housework without pain; in 8 weeks; progressing toward  Pt will: be able to work a full day without pain; in 8 weeks; progressing toward    Plan / Patient Education:     Continue with initial plan of care.  Progress with home program as tolerated.  Recheck pelvic landmarks and correct as needed.  Progress HEP as able.    Subjective:     Pain Rating:  2  The patient reports that she has been sore since last session.  She has more pain during the day and some at night.      Objective:     Pain with lumbar extension on the R of spine and with R rotation.  R LE appears min longer supine.    Exercise #1: Ab sets/LE extension  Comment #1: HEP  Exercise #2: Slump sliders  Comment #2: HEP  Exercise #3: PPT  Comment #3: HEP  Exercise #4: Anterior ilial rotation MET  Comment #4: R x 10  Exercise #5: Bridging  Comment #5: x 5  Exercise #6: Piriformis stretch  Comment #6: 30 seconds  Exercise #7: Hip flexor stretch  Comment #7: 30 seconds    Appt time: 7:32AM - 8:02AM    Treatment Today     TREATMENT MINUTES COMMENTS   Evaluation     Self-care/ Home management     Manual therapy 15 L S/L R lumbar rotation mobs grades II-III   Neuromuscular Re-education     Therapeutic Activity     Therapeutic Exercises 15 NUSTEP x 5 minutes WL 5.0; subjective measures taken  See flowsheet   Gait training     Modality__________________                Total 30    Blank areas are intentional and mean the treatment did not include these items.       Jennifer Rodriguez, PT, DPT  7/27/2018

## 2021-06-19 NOTE — PROGRESS NOTES
ASSESSMENT: Deyanira Kelley is a 53 y.o. female who presents for consultation at the request of [facility] PCP Israel Ng MD, with a past medical history significant for hyperlipidemia, type 2 diabetes, urge incontinence -with previous bladder sling surgery, major depression, anxiety, obesity, lymphocytic colitis who presents today for new patient evaluation of:    -Acute flareup of right low back pain with right sciatica ongoing since January with severe debilitating pain since 7/11/2018.    Patient does have some increased pain with both straight leg raise as well as SI provocative maneuvers today.  Minimal tenderness over SI joint however.    Patient is neurologically intact on exam. No myelopathic or red flag symptoms.    CHAMP Score: 42    WHO 5: 17     PHQ-2: 0    Diagnoses and all orders for this visit:    Acute right-sided low back pain with right-sided sciatica  -     MR Lumbar Spine Without Contrast; Future; Expected date: 7/12/18  -     MR Lumbar Spine Without Contrast; Standing  -     MR Lumbar Spine Without Contrast  -     tiZANidine (ZANAFLEX) 2 MG tablet; Take 1-2 tablets (2-4 mg total) by mouth every 8 (eight) hours as needed.  Dispense: 28 tablet; Refill: 0  -     diclofenac (VOLTAREN) 50 MG EC tablet; Take 1 tablet (50 mg total) by mouth 3 (three) times a day as needed.  Dispense: 42 tablet; Refill: 0    Lumbar facet arthropathy      PLAN:  Reviewed spine anatomy and disease process. Discussed diagnosis and treatment options with the patient today. A shared decision making model was used.  The patient's values and choices were respected. The following represents what was discussed and decided upon by the provider and the patient.      -DIAGNOSTIC TESTS:  Images were personally reviewed and interpreted and explained to patient today using spine model.   --Ordered lumbar spine MRI to further evaluatesignificant pain.  --Lumbar spine CT scan 7/11/2018 shows mild disc height loss at L3-4 and disc bulge at  L4-5 with mild central canal stenosis.  No foraminal stenosis noted.  Degenerative facet sclerosis noted at L4 through S1.    -PHYSICAL THERAPY: Likely recommend physical therapy pending MRI review.  Discussed the importance of core strengthening, ROM, stretching exercises with the patient and how each of these entities is important in decreasing pain.  Explained to the patient that the purpose of physical therapy is to teach the patient a home exercise program.  These exercises need to be performed every day in order to decrease pain and prevent future occurrences of pain.        -MEDICATIONS: Prescribed diclofenac 50 mg 1 tablet 3 times daily for pain and inflammation.  -Also recommend tizanidine 2 mg 1-2 tablets twice daily as needed for myofascial pain and to help with sleeping.  Discussed multiple medication options today with patient. Discussed risks, side effects, and proper use of medications. Patient verbalized understanding.    -INTERVENTIONS: Did discuss the possibility of injections PRITI on the right if there is nerve involvement, otherwise could consider Right SI joint steroid injection pending MRI review given her debilitating pain.  Discussed risks and benefits of injections with patient today.    -PATIENT EDUCATION:  45 minutes of total visit time was spent face to face with the patient today, greater than 50% of total time spent with patient was spent on counseling, education, and coordinating care.   -10 minutes spent outside of visit time, non-face-to-face time, reviewing chart.    -FOLLOW-UP:   Advised patient to follow-up after MRI however we can call her with the results once we get them as well.    Advised patient to call the [facility] if symptoms worsen or you have problems controlling bladder and bowel function.   ______________________________________________________________________    SUBJECTIVE:  HPI:  Deyanira HARRISON Kelley  Is a 53 y.o. female who presents today for new patient evaluation of  low back pain right lumbosacral junction radiating to the right buttock with right leg pain somewhat nonspecific pattern however she reports it is worse into the posterior buttock posterior thigh posterior calf that has been off and on since January 2018 with no history prior to that of back pain.  Today her pain is a 5/10 constant dull type pain that becomes sharp and shooting with pertinent movements.  Yesterday the pain was severe and debilitating and was in 9/10 and she did go to [place] ED, where they completed a CT scan.    Patient denies any left leg symptoms, denies weakness or recent trips or falls however when the pain is severe she feels the right leg is weak.  Denies numbness or tingling.  Denies bowel or bladder dysfunction.  History of bladder sling surgery.    Patient is very active typically with playing volleyball and boxing.    -Treatment to Date: No prior spinal surgery or spinal injection.  No prior physical therapy or chiropractic manipulation.    -Medications:  Diclofenac oral medication with minimal benefit, has only taken one dose however.  Diclofenac gel-has not started    **Zoloft SSRI    Current Outpatient Prescriptions on File Prior to Encounter   Medication Sig Dispense Refill     aspirin 81 MG EC tablet Take 1 tablet (81 mg total) by mouth daily.  0     atorvastatin (LIPITOR) 20 MG tablet Take 20 mg by mouth daily.        blood glucose test (ONETOUCH VERIO) strips Test 5 times per day.       budesonide (ENTOCORT EC) 3 mg 24 hr capsule Take 9 mg by mouth daily.        glipiZIDE (GLUCOTROL XL) 2.5 MG 24 hr tablet Take 15 mg by mouth.       insulin glargine (LANTUS SOLOSTAR U-100 INSULIN) 100 unit/mL (3 mL) pen Inject 20 Units under the skin every morning.        metFORMIN (GLUCOPHAGE-XR) 500 MG 24 hr tablet Take 2,000 mg by mouth daily.        nicotine, polacrilex, (NICORETTE) 2 mg lozenge Apply 2 mg to cheek.       ONETOUCH DELICA LANCETS 30 gauge Misc        sertraline (ZOLOFT) 100 MG  tablet Take 1 tablet (100 mg total) by mouth daily. 90 tablet 3     potassium chloride SA (K-DUR,KLOR-CON) 10 MEQ tablet Take three po qday and recheck potassium as advised 60 tablet 1     [DISCONTINUED] diclofenac sodium (VOLTAREN) 1 % Gel Apply 4 g topically every 6 (six) hours as needed. Apply 4 grams per application. 1 Tube 1     No current facility-administered medications on file prior to encounter.        Allergies   Allergen Reactions     Oxycodone-Acetaminophen Itching     Empagliflozin Rash       Past Medical History:   Diagnosis Date     Anxiety      Depression      Diabetes mellitus (H)         Patient Active Problem List   Diagnosis     Hyperlipidemia     Rectocele     Urge incontinence     Major depression     Anxiety     Lymphocytic colitis     Type 2 diabetes mellitus without complication (H)     Obesity       Past Surgical History:   Procedure Laterality Date     HYSTERECTOMY       [place] REMOVAL OF OVARY(S)      Description: Oophorectomy;  Recorded: 12/19/2007;  Comments: '89     [place] VAG HYST,RMV VAG,FIX ENTEROCELE      Description: Vaginal Hysterectomy With Colpectomy & Enterocele Repair;  Recorded: 12/19/2007;  Comments: 11/07 Joe Shipman and Ashlie       Family History   Problem Relation Age of Onset     Heart disease Mother      Cancer Father      Diabetes Father      Stroke Maternal Grandmother      Breast cancer Neg Hx        Reviewed past medical, surgical, and family history with patient found on new patient intake packet located in [employer] tab.     SOCIAL HX: Patient is , works as a .  Patient denies smoking currently, quit 14 years ago but does still use nicotine lozenges.  Patient occasionally drinks alcohol 1-2 drinks on weekends, was a heavy drinker in college but denies current.  Denies occasional drug use.    ROS: Positive for poor sleep quality, back pain, leg pain, depression/worry, anxiety, constipation. Specifically negative for bowel/bladder dysfunction,  balance changes, headache, dizziness, foot drop, fevers, chills, appetite changes, nausea/vomiting, unexplained weight loss. Otherwise 13 systems reviewed are negative. Please see the patient's intake questionnaire from today for details.    OBJECTIVE:  LMP  (LMP Unknown)    PHYSICAL EXAMINATION:    --CONSTITUTIONAL:  Vital signs as above.  Patient does appear to be in acute pain today.  The patient is well nourished and well groomed.  --PSYCHIATRIC:  Appropriate mood and affect. The patient is awake, alert, oriented to person, place, time and answering questions appropriately with clear speech.    --SKIN:  Skin over the face, bilateral lower extremities, and posterior torso is clean, dry, intact without rashes.    --RESPIRATORY: Normal rhythm and effort. No abnormal accessory muscle breathing patterns noted.   --STANDING EXAMINATION:  Normal lumbar lordosis noted, no lateral shift.  --MUSCULOSKELETAL: Lumbar spine inspection reveals no evidence of deformity. Range of motion is not limited in lumbar flexion, extension, lateral rotation. No tenderness to palpation lumbar spine. Straight leg raising in the supine position is negative to radicular pain on the left and positive on the right to back and leg pain. Sciatic notch non-tender bilaterally.  --SACROILIAC JOINT: Positive right distraction.  Positive right Joaquin's with reproduction of pain to affected extremity.    --GROSS MOTOR: Gait is non-antalgic. Easily arises from a seated position. Toe walking and heel walking are normal without significant difficulty.    --LOWER EXTREMITY MOTOR TESTING:  Plantar flexion left 5/5, right 5/5   Dorsiflexion left 5/5, right 5/5   Great toe MTP extension left 5/5, right 5/5  Knee flexion left 5/5, right 5/5  Knee extension left 5/5, right 5/5   Hip flexion left 5/5, right 5/5  Hip abduction left 5/5, right 5/5  Hip adduction left 5/5, right 5/5   --HIPS: Full range of motion bilaterally. Negative FABERs on the involved lower  extremity.   --NEUROLOGICAL:  2/4 patellar, medial hamstring, and achilles reflexes bilaterally.  Sensation to light touch is intact in the bilateral L4, L5, and S1 dermatomes. Babinski is negative. No clonus.  Negative Katalina reflex bilaterally.  --VASCULAR:  2/4 dorsalis pedis and posterior tibialsi pulses bilaterally.  Bilateral lower extremities are warm.  There is no pitting edema of the bilateral lower extremities.    RESULTS: Prior medical records from [facility] 12/14/[facility] to current and care everywhere were reviewed today.    Imaging: Lumbar spine Imaging was personally reviewed and interpreted today. The images were shown to the patient and the findings were explained using a spine model.      CT LUMBAR SPINE W/O CONTRAST  7/11/2018 - [place]  HISTORY: Low back pain.  COMPARISON: None.  FINDINGS: There is no acute fracture or malalignment. There is mild disc height loss at L3-L4. Mild left paracentral disc bulge. There is mild diffuse disc bulge at L4-L5. There is mild spinal stenosis. There is no significant foraminal stenosis. There is degenerative facet sclerosis from L4-S1. The paraspinal soft tissues are unremarkable.  IMPRESSION: No acute abnormality. Mild multilevel degenerative  disease.

## 2021-06-19 NOTE — PROGRESS NOTES
[facility]   Lumbo-Pelvic Initial Evaluation    Patient Name: Deyanira Kelley  Date of evaluation: 7/19/2018  Referral Diagnosis: Lumbalgia  Referring provider: Ketty Moscoso PA-C  Visit Diagnosis:     ICD-10-CM    1. SI (sacroiliac) joint dysfunction M53.3    2. Right lumbar radiculitis M54.16    3. Muscle weakness (generalized) M62.81        Assessment:        Deyanira Kelley is a 53 y.o. female who presents to therapy today with chief complaints of right low back pain with radiation into her R leg. Onset date of sx was January 2018 with worsening symptoms 7/11/18.  Pt reported h/o hypercholesterolemia, type 2 diabetes, hyperlipidemia, major depression, anxiety, obesity, and lymphocitic colitis.  Pain symptoms are dull in nature with episodes of sharp pain.  She has not had LE symptoms since her ED visit on 7/11/18.  Functional impairments include recreational activities and housework.  Pt demo's signs and sx consistent with SI joint dysfunction with likely R anterior ilial rotation and L4/L5 lumbar radiculitis with facet pathology.   Pt. is appropriate for skilled PT intervention as outlined in the Plan of Care (POC).  Pt. is a good candidate for skilled PT services to improve pain levels and function.    Goals:  Pt. will be independent with home exercise program in : 6 weeks  Pt will: be able to do recreational activities without pain; in 8 weeks  Pt will: be able to perform gardening and housework without pain; in 8 weeks  Pt will: be able to work a full day without pain; in 8 weeks    Patient's expectations/goals are realistic.    Barriers to Learning or Achieving Goals:  No Barriers.       Plan / Patient Instructions:        Plan of Care:   Communication with: Referral Source  Patient Related Instruction: Nature of Condition;Treatment plan and rationale;Self Care instruction;Basis of treatment;Body mechanics;Posture  Times per Week: 1-2  Number of Weeks: 6-12  Number of Visits: 12  Precautions /  Restrictions : None  Therapeutic Exercise: ROM;Stretching;Strengthening  Neuromuscular Reeducation: kinesio tape;posture;core  Manual Therapy: soft tissue mobilization;myofascial release;joint mobilization;muscle energy;strain counterstrain  Modalities: electrical stimulation;traction;ultrasound    POC and pathology of condition were reviewed with patient.  Pt. is in agreement with the Plan of Care  A Home Exercise Program (HEP) was initiated today.  Pt. was instructed in exercises by PT and patient was given a handout with detailed instructions.    Plan for next visit: Recheck pelvic landmarks.  Lumbar rotation mobs PRN.  Progress core strengthening.     Subjective:       The patient reports that she had some stiffness while at work on July 9-10.  Tuesday night, she sat in her recliner and put the heating pad on her back.  She could hardly get into bed because her leg and back were hurting so bad.  She wasn't able to sleep in either her bed or another.  Tried other surfaces, and then decided to go to the ED.  For the most part, she is a very active person.  Sine going to the ED, the pain has been progressively getting better.  She now would feel comfortable enough to walk around the block.  The pain is now just in the back, not her leg.    Social information:   Living Situation:single family home   Occupation: Compliance   Work Status:Working full time   Equipment Available: None    Pain Ratin  Pain rating at best: 1  Pain rating at worst: 9  Pain description: dull, but can be sharp if she moves certain ways.    Functional limitations are described as occurring with:   recreational activities, housework    Patient reports benefit from:  anti-inflammatory, pain medication, cold       Objective:      Note: Items left blank indicates the item was not performed or not indicated at the time of the evaluation.    Patient Outcome Measures :    Modified Oswestry Low Back Pain Disablity Questionnaire  in %: 22   Scores  range from 0-100%, where a score of 0% represents minimal pain and maximal function. The minimal clinically important difference is a score reduction of 12%.    MRI Results from 7/13/18 (per chart review):  1.  No high-grade central canal narrowing in the lumbar region.  2.  No right-sided disc herniation or severe right-sided neural foraminal  narrowing.  3.  At the L4-L5 level, there is lateral disc bulging and spurring on both sides  resulting in mild narrowing of both L4-L5 neural foramen.  4.  At the L4-L5 level, there is moderate to advanced facet arthropathy. This is  slightly greater on the right. There is mild bone edema in the right L4 and  right L5 pedicles noted on the STIR sequence with some slight inflammatory  change about the right L4-L5 facet. Presumably these changes are all reactive  and degenerative in nature.    Examination  1. SI (sacroiliac) joint dysfunction     2. Right lumbar radiculitis     3. Muscle weakness (generalized)       Involved side: Right  Posture Observation:      General standing posture is fair.  Lumbopelvic complex: Moderately decreased lumbar lordosis    Lumbar ROM:    Date: 07/19/18     *Indicate scale AROM AROM AROM   Lumbar Flexion Mod limited     Lumbar Extension Mod limited, pain R      Right Left Right Left Right Left   Lumbar Sidebending Stiff WNL       Lumbar Rotation Pain WNL       Thoracic Flexion WNL     Thoracic Extension WNL     Thoracic Sidebending WNL WNL       Thoracic Rotation WNL WNL         Lower Extremity Strength:     Date: 07/19/18     LE strength/5 Right Left Right Left Right Left   Hip Flexion (L1-3) 5 5       Hip Extension (L5-S1)         Hip Abduction (L4-5)         Hip Adduction (L2-3)         Hip External Rotation         Hip Internal Rotation         Knee Extension (L3-4) 5 5       Knee Flexion 5 5       Ankle Dorsiflexion (L4-5) 5 5       Great Toe Extension (L5)         Ankle Plantar flexion (S1)         Abdominals        Sensation             Reflex Testing  Lumbar Dermatomes Right Left UE Reflexes Right Left   Iliac Crest and Groin (L1)   Biceps (C5-6)     Anterior Medial Thigh (L2)   Brachioradialis (C5-6)     Anterior Thigh, Medial Epicondyle Femur (L3)   Triceps (C7-8)     Lateral Thigh, Anterior Knee, Medial Leg/Malleolus (L4)   Katalina s test     Lateral Leg, Dorsal Foot (L5)   LE Reflexes     Lateral Foot (S1)   Patellar (L3-4)     Posterior Leg (S2)   Achilles (S1-2)     Other:   Babinski Response       Palpation: R LE longer, R ASIS lower supine, R PSIS higher.  Tenderness R lumbar region lateral to L4/5, R PSIS, R glute med/min.    Lumbar Special Tests:     Lumbar Special Tests Right Left SI Tests Right  Left   Quadrant test   SI Compression - -   Straight leg raise - - SI Distraction + -   Crossover response   POSH Test     Slump + - Sacral Thrust + +   Sit-up test  FADIR     Trunk extensor endurance test  Resisted Abduction     Prone instability test  Other: ARLET + -   Pubic shotgun + Other: thigh thrust - +     Repeated Motion Testing:  Not indicated    Passive Mobility - Joint Integrity:  Pain at L3/4, L4/5, and L5/S1 facet joints R.  Pain at L5 centrally with hypomobility.    LE Screen:  WNL    Appt time: 7:23AM - 8:23AM    Treatment Today     TREATMENT MINUTES COMMENTS   Evaluation 25 Low complexity lumbosacral evaluation   Self-care/ Home management     Manual therapy 10 L S/L R lumbar rotation mobs grades I-II  Supine pubic tubercle correction with overpressure x 3 and shotgun afterwards   Neuromuscular Re-education     Therapeutic Activity     Therapeutic Exercises 25 Demo/performance of HEP  Patient educated on pathology  Discussed POC   Gait training     Modality__________________                Total 60    Blank areas are intentional and mean the treatment did not include these items.     PT Evaluation Code: (Please list factors)  Patient History/Comorbidities: hypercholesterolemia, type 2 diabetes, hyperlipidemia, major  depression, anxiety, obesity, and lymphocitic colitis  Examination: +SI joint provocation tests, pain with hypomobility L4/5  Clinical Presentation: Stable  Clinical Decision Making: Low complexity    Patient History/  Comorbidities Examination  (body structures and functions, activity limitations, and/or participation restrictions) Clinical Presentation Clinical Decision Making (Complexity)   No documented Comorbidities or personal factors 1-2 Elements Stable and/or uncomplicated Low   1-2 documented comorbidities or personal factor 3 Elements Evolving clinical presentation with changing characteristics Moderate   3-4 documented comorbidities or personal factors 4 or more Unstable and unpredictable High            Jennifer Rodriguez, PT, DPT  7/19/2018  8:52 AM

## 2021-06-19 NOTE — PROGRESS NOTES
[facility] Daily Progress     Patient Name: Deyanira Kelley  Date: 7/25/2018  Visit #: 2  PTA visit #:  NA  Referral Diagnosis: Lumbalgia  Referring provider: Ketty Moscoso PA-C  Visit Diagnosis:     ICD-10-CM    1. SI (sacroiliac) joint dysfunction M53.3    2. Right lumbar radiculitis M54.16    3. Muscle weakness (generalized) M62.81      Deyanira Kelley is a 53 y.o. female who presents to therapy today with chief complaints of right low back pain with radiation into her R leg. Onset date of sx was January 2018 with worsening symptoms 7/11/18.  Pt reported h/o hypercholesterolemia, type 2 diabetes, hyperlipidemia, major depression, anxiety, obesity, and lymphocitic colitis.  Pain symptoms are dull in nature with episodes of sharp pain.  She has not had LE symptoms since her ED visit on 7/11/18.  Functional impairments include recreational activities and housework.  Pt demo's signs and sx consistent with SI joint dysfunction with likely R anterior ilial rotation and L4/L5 lumbar radiculitis with facet pathology.     Precautions / Restrictions : None    Assessment:     HEP/POC compliance is  good .  The patient demonstrates improvements in her overall pain level and function.  Pelvic landmarks were level after treatment today and she is appropriate to continue with PT services at this time.    Goal Status:  Pt. will be independent with home exercise program in : 6 weeks;Progressing toward  Pt will: be able to do recreational activities without pain; in 8 weeks; progressing toward  Pt will: be able to perform gardening and housework without pain; in 8 weeks; progressing toward  Pt will: be able to work a full day without pain; in 8 weeks; progressing toward    Plan / Patient Education:     Continue with initial plan of care.  Progress with home program as tolerated.  Recheck pelvic landmarks and correct as needed.  Progress HEP as able.    Subjective:     Pain Rating: 3  The patient reports that she felt  awesome after her last PT appointment.  Everything was feeling good until yesterday when she went back to work.  The pain started back at 10 or 11:00AM.  The pain is not as bad as it was, but she can definitely tell it is there.    Objective:     Pain with lumbar extension centrally on spine.  R LE appears longer supine.  R ASIS appears lower supine.    Exercise #1: Ab sets/LE extension  Comment #1: HEP  Exercise #2: Slump sliders  Comment #2: HEP  Exercise #3: PPT  Comment #3: HEP  Exercise #4: Anterior ilial rotation MET  Comment #4: R x 10  Exercise #5: Bridging  Comment #5: x 10    Appt time: 11:18AM - 11:48AM    Treatment Today     TREATMENT MINUTES COMMENTS   Evaluation     Self-care/ Home management     Manual therapy 20 L S/L R lumbar rotation mobs grades II-III  Supine pubic tubercle correction with overpressure x 3 and shotgun afterwards  Supine R hip longitudinal pull x 10 grade III  Supine R hip longitudinal pull with IR SI joint mob x 10 reps   Neuromuscular Re-education     Therapeutic Activity     Therapeutic Exercises 10 NUSTEP x 5 minutes WL 5.0; subjective measures taken  See flowsheet   Gait training     Modality__________________                Total 30    Blank areas are intentional and mean the treatment did not include these items.       Jennifer Rodriguez, PT, DPT  7/25/2018

## 2021-06-19 NOTE — PROGRESS NOTES
Assessment / Plan:     Diagnoses and all orders for this visit:    Lumbalgia  -     Ambulatory referral to Physical Therapy    Lumbar disc herniation  -     Ambulatory referral to Physical Therapy    Lumbar radiculitis  -     Ambulatory referral to Physical Therapy    Myofascial pain  -     Ambulatory referral to Physical Therapy    Facet arthropathy, lumbosacral  -     Ambulatory referral to Physical Therapy          Deyanira Kelley is a 53 y.o. y.o. female with past medical history significant for hyperlipidemia, depression, anxiety, diabetes mellitus type 2, obesity, who presents today for follow-up regarding flareup of right-sided low back pain that was radiating to the right lower extremity.  Patient reports resolution of the radicular pain to the right lower extremity, however, she still has significant right-sided low back pain that is likely due to the advanced facet arthropathy at the L4-L5 and the mild bone edema at the right L4, and right L5 pedicle.  The previous radicular pain to the right lower extremity that was likely due to the mild right neural foraminal stenosis at the L4-L5 due to disc protrusion at the L4-L5.  Also there is a tiny right posterior lateral annular tear at the L4-L5 that can contribute to the lower back pain.  No red flags.    A shared decision making plan was used.  The patient's values and choices were respected.  The following represents what was discussed and decided upon by the physician and the patient.      1.  DIAGNOSTIC TESTS: lumbar MRI that was ordered on her last visit.  The lumbar MRI shows showed disc bulge at the L4-L5 with tiny right posterior lateral annulus tear, and mild neural foraminal stenosis at the right L4-L5 that could be contributing to the history of the radicular pain to the right lower extremity.  Patient also has significant advanced facet arthropathy at the L4-L5 and mild bone edema at the right L4, and right L5 pedicle that is probably contributing to  the low back pain.  I reviewed the lumbar MRI imaging and the report with the patient today.  The patient verbalizes understanding.  2.  PHYSICAL THERAPY: I ordered physical therapy program to help with improving core strength and range of motion of the lumbar spine.  3.  MEDICATIONS: Patient will continue on Voltaren 50 mg 3 times a day.  Patient does not have radicular pain to the lower extremity anymore therefore we will not start gabapentin today.  4.  INTERVENTIONS: We will consider therapeutics steroid injection as right  facet steroid injection in the future if she does not improve with PT and medication.  5.  PATIENT EDUCATION: I thoroughly discussed the plan with the patient today.  She verbalizes understanding and agrees with the plan.  6.  FOLLOW-UP: Patient will follow up with Ping Wray CNP in 4  weeks.  All questions were answered.            Subjective:     Deyanira Kelley is a 53 y.o. female who presents today for follow-up regarding right-sided low back pain radiating to the right lower extremity.  Today patient returns to the clinic reporting cessation of the radicular pain to the right lower extremity.  She only has right-sided low back pain that is significant.  At this time she reports 5 out of 10 intensity constant pain in the right lower back.  Her symptoms are aggravated by sitting for too long, walking for too long and rates it at 9 out of 10 intensity on its worse.  Today patient presents to review her lumbar MRI that was ordered on her last visit.  The lumbar MRI shows showed disc bulge at the L4-L5 with tiny right posterior lateral annulus tear, and mild neural foraminal stenosis at the right L4-L5 that could be contributing to the history of the radicular pain to the right lower extremity.  Patient also has significant advanced facet arthropathy at the L4-L5 that is probably contributing to the low back pain.  Patient stated that she did not start on Voltaren 50 mg ordered by Ping  Schuelke, CNP her last visit.  Patient stated that she does not want to mask the pain. Patient denies urinary or bowel incontinence, unintentional weight loss, saddle anesthesia, fever or chills, balance difficulties.    Review of Systems:  Right-sided low back pain, history of radicular pain to the right lower extremity.Patient denies urinary or bowel incontinence, unintentional weight loss, saddle anesthesia, fever or chills, balance difficulties.       Objective:   CONSTITUTIONAL:  Vital signs as above.  No acute distress.  The patient is well nourished and well groomed.    PSYCHIATRIC:  The patient is awake, alert, oriented to person, place and time.  The patient is answering questions appropriately with clear speech.  Normal affect.  SKIN:  Skin over the face, posterior torso, bilateral upper and lower extremities is clean, dry, intact without rashes.  MUSCULOSKELETAL:   Gait is antalgic.   The patient is able to heel and toe walk with some difficulty.  Mild tenderness over the bilateral L4-L5 lumbar paraspinal muscles, worse on the right side.      The patient has 5/5 strength for the bilateral hip flexors, knee flexors/extensors, ankle dorsiflexors/plantar flexors, ankle evertors/invertors.    NEUROLOGICAL:  1/4 patellar, medial hamstring, achilles reflexes which are symmetric bilaterally.  No ankle clonus bilaterally.  Sensation to light touch is intact in the bilateral L4, L5, and S1 dermatomes.  Positive right straight leg raise.  Negative left straight leg raise.  Negative hip impingement Christiano test bilaterally.     RESULTS:      EXAM DATE:         07/13/2018     [facility]  MRI LUMBAR SPINE W/O CONTRAST  7/13/2018 7:00 AM     INDICATION: Low back pain and right lower extremity symptoms.  TECHNIQUE: Without IV contrast.  CONTRAST: None.  COMPARISON: None.     FINDINGS: Nomenclature is based on 5 lumbar-type vertebral bodies. Mild edema  noted in the right L4 and right L5 bony pedicles  likely reactive or degenerative  in nature. No pars defect. The conus tip is identified at L1. Grossly normal  paraspinal soft tissues. No abdominal aortic aneurysm. The visualized portions  of the bony pelvis are normal for age.     T12-L1: Normal disc height and signal. No herniation. No facet arthropathy. No  spinal canal stenosis. No right neural foraminal stenosis. No left neural  foraminal stenosis.     L1-L2: Normal disc height and signal. No herniation. No facet arthropathy. No  spinal canal stenosis. No right neural foraminal stenosis. No left neural  foraminal stenosis.     L2-L3: Normal disc height and signal. No herniation. No facet arthropathy. No  spinal canal stenosis. No right neural foraminal stenosis. No left neural  foraminal stenosis.     L3-L4: Minimal loss of disc height and signal with minimal disc bulging. Mild  facet arthropathy. No spinal canal stenosis. No right neural foraminal stenosis.  No left neural foraminal stenosis.     L4-L5: Slight loss of disc height and signal with shallow disc bulging and a  tiny right posterolateral annular tear. Tiny lateral disc protrusion on the  left. Moderate to advanced facet arthropathy. No spinal canal stenosis. Mild  right neural foraminal stenosis. Mild left neural foraminal stenosis.     L5-S1: Normal disc height and signal. No herniation. Moderate facet arthropathy.  No spinal canal stenosis. No right neural foraminal stenosis. No left neural  foraminal stenosis.     CONCLUSION:  1.  No high-grade central canal narrowing in the lumbar region.  2.  No right-sided disc herniation or severe right-sided neural foraminal  narrowing.  3.  At the L4-L5 level, there is lateral disc bulging and spurring on both sides  resulting in mild narrowing of both L4-L5 neural foramen.  4.  At the L4-L5 level, there is moderate to advanced facet arthropathy. This is  slightly greater on the right. There is mild bone edema in the right L4 and  right L5 pedicles noted  on the STIR sequence with some slight inflammatory  change about the right L4-L5 facet. Presumably these changes are all reactive  and degenerative in nature.

## 2021-06-19 NOTE — PROGRESS NOTES
Received outside disc from [facility] on 7/20/18. Label with patient's HE MRN # and to be burned into HE NIL. Provider reviewed images on 7/20/18.    Outside disc contains:    Xray SI joints 6/5/18

## 2021-06-19 NOTE — PROGRESS NOTES
[facility] Daily Progress     Patient Name: Deyanira Kelley  Date: 2018  Visit #: 4  PTA visit #:  NA  Referral Diagnosis: Lumbalgia  Referring provider: Ketty Moscoso PA-C  Visit Diagnosis:     ICD-10-CM    1. SI (sacroiliac) joint dysfunction M53.3    2. Right lumbar radiculitis M54.16    3. Muscle weakness (generalized) M62.81      Deyanira Kelley is a 53 y.o. female who presents to therapy today with chief complaints of right low back pain with radiation into her R leg. Onset date of sx was 2018 with worsening symptoms 18.  Pt reported h/o hypercholesterolemia, type 2 diabetes, hyperlipidemia, major depression, anxiety, obesity, and lymphocitic colitis.  Pain symptoms are dull in nature with episodes of sharp pain.  She has not had LE symptoms since her ED visit on 18.  Functional impairments include recreational activities and housework.  Pt demo's signs and sx consistent with SI joint dysfunction with likely R anterior ilial rotation and L4/L5 lumbar radiculitis with facet pathology.     Precautions / Restrictions : None    Assessment:     HEP/POC compliance is  good .  The patient had level pelvic landmarks after initial SI joint correction today.  She was able to tolerate treatment well and is appropriate to continue with PT services at this time.    Goal Status:  Pt. will be independent with home exercise program in : 6 weeks;Progressing toward  Pt will: be able to do recreational activities without pain; in 8 weeks; progressing toward  Pt will: be able to perform gardening and housework without pain; in 8 weeks; progressing toward  Pt will: be able to work a full day without pain; in 8 weeks; progressing toward    Plan / Patient Education:     Continue with initial plan of care.  Progress with home program as tolerated.  Recheck pelvic landmarks and correct as needed.  Progress HEP as able.    Subjective:     Pain Ratin  The patient reports that the anterior rotation  correction and the bridging seem to help the most.    Objective:     R LE appears longer supine.    Exercise #1: Ab sets/LE extension  Comment #1: HEP  Exercise #2: Slump sliders  Comment #2: HEP  Exercise #3: PPT  Comment #3: HEP  Exercise #4: Anterior ilial rotation MET  Comment #4: R HEP  Exercise #5: Bridging  Comment #5: HEP  Exercise #6: Piriformis stretch  Comment #6: 30 seconds  Exercise #7: Hip flexor stretch  Comment #7: 30 seconds  Exercise #8: Clamshell  Comment #8: x 10    Appt time: 7:30AM - 7:56AM    Treatment Today     TREATMENT MINUTES COMMENTS   Evaluation     Self-care/ Home management     Manual therapy 15 R SI joint correction with supine rotation wind-up x 2  Prone PA mobs to sacrum, L5, L4 centrally x 30 x 3 grade III   Neuromuscular Re-education     Therapeutic Activity     Therapeutic Exercises 11 NUSTEP x 5 minutes WL 5.0; subjective measures taken  See flowsheet   Gait training     Modality__________________                Total 26    Blank areas are intentional and mean the treatment did not include these items.       Jennifer Rodriguez, PT, DPT  7/30/2018

## 2021-06-19 NOTE — PROGRESS NOTES
"[facility] Clinic Note    Patient Name: Deyanira Kelley  Patient Age: 53 y.o.  YOB: 1965  MRN: [id number]    Date of visit: 7/12/2018    Patient Active Problem List   Diagnosis     Hyperlipidemia     Rectocele     Urge incontinence     Major depression     Anxiety     Lymphocytic colitis     Type 2 diabetes mellitus without complication (H)     Obesity     Social History     Social History Narrative     Family History   Problem Relation Age of Onset     Breast cancer Neg Hx      Outpatient Encounter Prescriptions as of 7/12/2018   Medication Sig Dispense Refill     aspirin 81 MG EC tablet Take 1 tablet (81 mg total) by mouth daily.  0     atorvastatin (LIPITOR) 20 MG tablet Take 20 mg by mouth.       blood glucose test (ONETOUCH VERIO) strips Test 5 times per day.       budesonide (ENTOCORT EC) 3 mg 24 hr capsule        glipiZIDE (GLUCOTROL XL) 2.5 MG 24 hr tablet Take 15 mg by mouth.       insulin glargine (LANTUS SOLOSTAR U-100 INSULIN) 100 unit/mL (3 mL) pen Inject under the skin.       metFORMIN (GLUCOPHAGE-XR) 500 MG 24 hr tablet Take 2,000 mg by mouth.       nicotine, polacrilex, (NICORETTE) 2 mg lozenge Apply 2 mg to cheek.       ONETOUCH DELICA LANCETS 30 gauge Misc        potassium chloride SA (K-DUR,KLOR-CON) 10 MEQ tablet Take three po qday and recheck potassium as advised 60 tablet 1     sertraline (ZOLOFT) 100 MG tablet Take 1 tablet (100 mg total) by mouth daily. 90 tablet 3     diclofenac sodium (VOLTAREN) 1 % Gel Apply 4 g topically every 6 (six) hours as needed. Apply 4 grams per application. 1 Tube 1     No facility-administered encounter medications on file as of 7/12/2018.        Chief Complaint:   Chief Complaint   Patient presents with     Follow-up     ER back pain visit       /72  Pulse 72  Temp 98.1  F (36.7  C) (Oral)   Resp 16  Ht 5' 7\" (1.702 m)  Wt 176 lb 7 oz (80 kg)  LMP  (LMP Unknown)  BMI 27.63 kg/m2  HPI:   Pain location:has had since January off and on.  Was " "worse on Tuesday.  Improved at this point.  No pain in right leg anymore but still having right low back pain 5/5.    Intensity:severe  Duration:above  How did it start:?  Quality:?  Radiation:Was down right leg to knee  Constant or intermittent:constant now.    Worsening:n  Improving:y  Makes worse:worse with bending over  Makes better:certain positions  History of back pain: Yes  Is this pain typical of previous back pain: Yes  Worse at night: no  Improves with recumbency: No  Was much worse with moving right leg.      No injury  Fever:no  Weight loss:no  Urinary Incontinence or inability to urinate: no  Fecal incontinence:no        Decreased sensation perianal or other part of body:no  Hematuria, dysuria or other urinary symptoms:no  History of renal calculus:no  Abdominal or chest pain: no  Weakness:no      History of back surgery:no  History of cancer or tuberculosis:no  History of immunocompromise: no  IV drug use: no  Alcohol abuse: no  History of vascular disease or known AAA: no  On anticoagulants:no    Impairment on tasks:Yes    Have tried treating with:tylenol extra strength extended release.  Took a voltaren - neither helped.        ROS: Pertinent ros findings in hpi, all other systems negative.    Objective/Physical Exam:     /72  Pulse 72  Temp 98.1  F (36.7  C) (Oral)   Resp 16  Ht 5' 7\" (1.702 m)  Wt 176 lb 7 oz (80 kg)  LMP  (LMP Unknown)  BMI 27.63 kg/m2    Gen: NAD, appears age  Skin: warm, dry  HENT: normocephalic atraumatic, MMM  Eyes: non-icteric, no proptosis  CV: NRRR no m/r/g, no peripheral edema  Resp: CTAB no w/r/r, normal respiratory effort  Abd: non-distended, soft  Hematologic: No petechiae or purpura  MSK: no muscle or joint swelling  Neuro: no dysarthria or gross asymmetry  Psych: Cooperative, full affect    Deep tendon Reflexes:    Patellar: 2/4 bilaterally    Strength:   Hip: 5/5  Knee extension: 5/5  Knee flexion: 5/5  1st toe dorsiflexion: 5/5  Ankle plantarflexion: " 5/5    Vascular:  Skin: warm, no pallor or cyanosis  Dorsalis pedis: 2+    Fine touch sensation: intact  Tone: normal    Right lower back ttp si area.  There is a taut band here but pain seems to be around this and over this area.      Assessment/Plan:  No results found for this or any previous visit (from the past 24 hour(s)).      ICD-10-CM    1. Visit for screening mammogram Z12.31 Mammo Screening Bilateral   2. Hypokalemia E87.6 Basic Metabolic Panel     Magnesium   3. Right-sided low back pain without sciatica M54.5 Ambulatory referral to Orthopedics       I reviewed previous documentation, I am placing a referral to orthopedics for this, may be SI in nature. May benefit from MRI.  Recommended cool packs and voltaren gel for now.  Gave precautions.     Patient Instructions   Use goodrx    If develop any weakness, numbness, bowel or bladder incontinence, significantly worsening back pain or any other concerning symptom go to ER immediately. Any fever, worsening night pain, other significant change in symptoms or not improving x 6-8 weeks seek medical attention promptly.        Counseled patient regarding treatments, treatment options, risks and benefits and diagnosis.  The patient was interactive, attentive, verbalized understanding, and we discussed plan.     Anthony Brody MD

## 2021-06-19 NOTE — PROGRESS NOTES
[facility] Daily Progress     Patient Name: Deyanira Kelley  Date: 2018  Visit #: 7  PTA visit #:  NA  Referral Diagnosis: Lumbalgia  Referring provider: Ketty Moscoso PA-C  Visit Diagnosis:     ICD-10-CM    1. SI (sacroiliac) joint dysfunction M53.3    2. Right lumbar radiculitis M54.16    3. Muscle weakness (generalized) M62.81      Deyanira Kelley is a 53 y.o. female who presents to therapy today with chief complaints of right low back pain with radiation into her R leg. Onset date of sx was 2018 with worsening symptoms 18.  Pt reported h/o hypercholesterolemia, type 2 diabetes, hyperlipidemia, major depression, anxiety, obesity, and lymphocitic colitis.  Pain symptoms are dull in nature with episodes of sharp pain.  She has not had LE symptoms since her ED visit on 18.  Functional impairments include recreational activities and housework.  Pt demo's signs and sx consistent with SI joint dysfunction with likely R anterior ilial rotation and L4/L5 lumbar radiculitis with facet pathology.     Precautions / Restrictions : None    Assessment:     HEP/POC compliance is  good .  The patient demonstrates overall improvements in her pain level and function since last session.  She was able to play volleyball without any residual effects.  She is appropriate to continue with PT services at this time.    Goal Status:  Pt. will be independent with home exercise program in : 6 weeks;Progressing toward  Pt will: be able to do recreational activities without pain; in 8 weeks; progressing toward  Pt will: be able to perform gardening and housework without pain; in 8 weeks; Met  Pt will: be able to work a full day without pain; in 8 weeks; Met    Plan / Patient Education:     Continue with initial plan of care.  Progress with home program as tolerated.  Recheck pelvic landmarks and correct as needed.  Continue with S/L mobs if helpful.  Progress HEP as able.    Subjective:     Pain Ratin  The  patient reports that she was able to play volleyball last week and was able to play last night and she did well.  The first time was a little sore, but she was able to be a little more aggressive last night and didn't have any residual effects.  She has not tried boxing yet d/t time constraints.    Objective:     Pain with R side bending on the R lumbar spine.  Pelvic landmarks level.    Exercise #1: Ab sets/LE extension  Comment #1: HEP  Exercise #2: Slump sliders  Comment #2: HEP  Exercise #3: PPT  Comment #3: HEP  Exercise #4: Anterior ilial rotation MET  Comment #4: R HEP  Exercise #5: Bridging  Comment #5: Progressed to bridge march x 10  Exercise #6: Piriformis stretch  Comment #6: HEP  Exercise #7: Hip flexor stretch  Comment #7: HEP  Exercise #8: Clamshell  Comment #8: HEP  Exercise #9: Cat/Cow  Comment #9: HEP  Exercise #10: LTR  Comment #10: HEP    Appt time: 7:20AM - 7:50AM    Treatment Today     TREATMENT MINUTES COMMENTS   Evaluation     Self-care/ Home management     Manual therapy 15 L S/L R lumbar rotation mobs grades I-II   Neuromuscular Re-education     Therapeutic Activity     Therapeutic Exercises 15 NUSTEP x 5 minutes WL 6.0; subjective measures taken  See flowsheet  Discussed trying to play some volleyball and begin boxing activities   Gait training     Modality__________________                Total 30    Blank areas are intentional and mean the treatment did not include these items.       Jennifer Rodriguez, PT, DPT  8/17/2018

## 2021-06-19 NOTE — PROGRESS NOTES
[facility] Daily Progress     Patient Name: Deyanira Kelley  Date: 8/2/2018  Visit #: 5  PTA visit #:  NA  Referral Diagnosis: Lumbalgia  Referring provider: Ketty Moscoso PA-C  Visit Diagnosis:     ICD-10-CM    1. SI (sacroiliac) joint dysfunction M53.3    2. Right lumbar radiculitis M54.16    3. Muscle weakness (generalized) M62.81      Deyanira Kelley is a 53 y.o. female who presents to therapy today with chief complaints of right low back pain with radiation into her R leg. Onset date of sx was January 2018 with worsening symptoms 7/11/18.  Pt reported h/o hypercholesterolemia, type 2 diabetes, hyperlipidemia, major depression, anxiety, obesity, and lymphocitic colitis.  Pain symptoms are dull in nature with episodes of sharp pain.  She has not had LE symptoms since her ED visit on 7/11/18.  Functional impairments include recreational activities and housework.  Pt demo's signs and sx consistent with SI joint dysfunction with likely R anterior ilial rotation and L4/L5 lumbar radiculitis with facet pathology.     Precautions / Restrictions : None    Assessment:     HEP/POC compliance is  good .  The patient demonstrates level pelvic landmarks at beginning of session today.  She is having pain primarily in her lower back on the R side.  She was able to tolerate treatment well as well as the new mobility exercises to her lumbar spine.  She is progressing well and is appropriate to continue with PT services at this time.    Goal Status:  Pt. will be independent with home exercise program in : 6 weeks;Progressing toward  Pt will: be able to do recreational activities without pain; in 8 weeks; progressing toward  Pt will: be able to perform gardening and housework without pain; in 8 weeks; progressing toward  Pt will: be able to work a full day without pain; in 8 weeks; progressing toward    Plan / Patient Education:     Continue with initial plan of care.  Progress with home program as tolerated.  Recheck  pelvic landmarks and correct as needed.  Continue with S/L mobs if helpful.  Progress HEP as able.    Subjective:     Pain Rating: 3  The patient reports that she has been doing her exercises but the pain is still there.  Tuesday, she was really sore but she is still in pain.  Sometimes the pain is better when she does her exercises, but sometimes she can't tell a difference.    Objective:     Pain with lumbar flexion, extension, and R rotation.    Exercise #1: Ab sets/LE extension  Comment #1: HEP  Exercise #2: Slump sliders  Comment #2: HEP  Exercise #3: PPT  Comment #3: HEP  Exercise #4: Anterior ilial rotation MET  Comment #4: R HEP  Exercise #5: Bridging  Comment #5: HEP  Exercise #6: Piriformis stretch  Comment #6: HEP  Exercise #7: Hip flexor stretch  Comment #7: HEP  Exercise #8: Clamshell  Comment #8: x 10  Exercise #9: Cat/Cow  Comment #9: x 10  Exercise #10: LTR  Comment #10: x 10    Appt time: 7:31AM - 7:56AM    Treatment Today     TREATMENT MINUTES COMMENTS   Evaluation     Self-care/ Home management     Manual therapy 15 L S/L R lumbar rotation mobs grades I-II   Neuromuscular Re-education     Therapeutic Activity     Therapeutic Exercises 10 NUSTEP x 5 minutes WL 6.0; subjective measures taken  See flowsheet   Gait training     Modality__________________                Total 25    Blank areas are intentional and mean the treatment did not include these items.       Jennifer Rodriguez, PT, DPT  8/2/2018

## 2021-06-19 NOTE — PROGRESS NOTES
[facility] Daily Progress     Patient Name: Deyanira Kelley  Date: 8/9/2018  Visit #: 6  PTA visit #:  NA  Referral Diagnosis: Lumbalgia  Referring provider: Ketty Moscoso PA-C  Visit Diagnosis:     ICD-10-CM    1. SI (sacroiliac) joint dysfunction M53.3    2. Right lumbar radiculitis M54.16    3. Muscle weakness (generalized) M62.81    4. Anxiety F41.9      Deyanira Kelley is a 53 y.o. female who presents to therapy today with chief complaints of right low back pain with radiation into her R leg. Onset date of sx was January 2018 with worsening symptoms 7/11/18.  Pt reported h/o hypercholesterolemia, type 2 diabetes, hyperlipidemia, major depression, anxiety, obesity, and lymphocitic colitis.  Pain symptoms are dull in nature with episodes of sharp pain.  She has not had LE symptoms since her ED visit on 7/11/18.  Functional impairments include recreational activities and housework.  Pt demo's signs and sx consistent with SI joint dysfunction with likely R anterior ilial rotation and L4/L5 lumbar radiculitis with facet pathology.     Precautions / Restrictions : None    Assessment:     HEP/POC compliance is  fair .  The patient continues to demonstrate even pelvic landmarks, but has not been working on her exercises d/t lack of pain.  She was encouraged to continue doing her exercises daily to maintain improvements.  She is progressing well toward goals and is going to try volleyball/boxing prior to next session.    Goal Status:  Pt. will be independent with home exercise program in : 6 weeks;Progressing toward  Pt will: be able to do recreational activities without pain; in 8 weeks; progressing toward  Pt will: be able to perform gardening and housework without pain; in 8 weeks; Met  Pt will: be able to work a full day without pain; in 8 weeks; Met    Plan / Patient Education:     Continue with initial plan of care.  Progress with home program as tolerated.  Recheck pelvic landmarks and correct as needed.   Continue with S/L mobs if helpful.  Progress HEP as able.    Subjective:     Pain Ratin  The patient reports that over the weekend, she had absolutely no pain.  This week she has had some pain, but not as bad as it was.  She has not done much of the exercises since she hasn't had much pain.    Objective:     Tinge with lumbar extension, R side bending, and R rotation.  Pelvic landmarks level.    Exercise #1: Ab sets/LE extension  Comment #1: HEP  Exercise #2: Slump sliders  Comment #2: HEP  Exercise #3: PPT  Comment #3: HEP  Exercise #4: Anterior ilial rotation MET  Comment #4: R HEP  Exercise #5: Bridging  Comment #5: HEP  Exercise #6: Piriformis stretch  Comment #6: HEP  Exercise #7: Hip flexor stretch  Comment #7: HEP  Exercise #8: Clamshell  Comment #8: HEP  Exercise #9: Cat/Cow  Comment #9: x 10  Exercise #10: LTR  Comment #10: x 10    Appt time: 7:20AM - 7:50AM    Treatment Today     TREATMENT MINUTES COMMENTS   Evaluation     Self-care/ Home management     Manual therapy 15 L S/L R lumbar rotation mobs grades I-II   Neuromuscular Re-education     Therapeutic Activity     Therapeutic Exercises 15 NUSTEP x 5 minutes WL 6.0; subjective measures taken  See flowsheet  Discussed trying to play some volleyball and begin boxing activities   Gait training     Modality__________________                Total 30    Blank areas are intentional and mean the treatment did not include these items.       Jennifer Rodriguez, PT, DPT  2018

## 2021-06-20 NOTE — LETTER
Letter by Jaelyn Galvan DO at      Author: Jaelyn Galvan DO Service: -- Author Type: --    Filed:  Encounter Date: 3/3/2020 Status: (Other)         March 3, 2020     Patient: Deyanira Kelley   YOB: 1965   Date of Visit: 3/3/2020       To Whom it May Concern:    Deyanira Kelley was seen in my clinic on 3/3/2020. Due to illness, she should remain out of work until symptoms resolve.    If you have any questions or concerns, please don't hesitate to call.    Sincerely,         Electronically signed by Jaelyn Galvan DO

## 2021-06-20 NOTE — PROGRESS NOTES
[facility] Daily Progress     Patient Name: Deyanira Kelley  Date: 2018  Visit #: 8  PTA visit #:  NA  Referral Diagnosis: Lumbalgia  Referring provider: Ketty Moscoso PA-C  Visit Diagnosis:     ICD-10-CM    1. SI (sacroiliac) joint dysfunction M53.3    2. Right lumbar radiculitis M54.16    3. Muscle weakness (generalized) M62.81      Deyanira Kelley is a 53 y.o. female who presents to therapy today with chief complaints of right low back pain with radiation into her R leg. Onset date of sx was 2018 with worsening symptoms 18.  Pt reported h/o hypercholesterolemia, type 2 diabetes, hyperlipidemia, major depression, anxiety, obesity, and lymphocitic colitis.  Pain symptoms are dull in nature with episodes of sharp pain.  She has not had LE symptoms since her ED visit on 18.  Functional impairments include recreational activities and housework.  Pt demo's signs and sx consistent with SI joint dysfunction with likely R anterior ilial rotation and L4/L5 lumbar radiculitis with facet pathology.     Precautions / Restrictions : None    Assessment:     HEP/POC compliance is  good .  The patient demonstrates improvements in lumbar ROM after treatment.  She is progressing toward goals and is appropriate to continue with PT services in 2 weeks.    Goal Status:  Pt. will be independent with home exercise program in : 6 weeks;Progressing toward  Pt will: be able to do recreational activities without pain; in 8 weeks; progressing toward  Pt will: be able to perform gardening and housework without pain; in 8 weeks; Met  Pt will: be able to work a full day without pain; in 8 weeks; Met    Plan / Patient Education:     Continue with initial plan of care.  Progress with home program as tolerated.  Recheck pelvic landmarks and correct as needed.  Continue with prone facet mobs if helpful.  Progress HEP as able.    Subjective:     Pain Ratin  The patient reports that she was on vacation last week and  her back was super sore.  Then it tapered off by Thursday/Friday, but has been fine since then.  When it was sore, she tried doing her exercises more often, which would help a little bit.  She is not sure what triggered it.      Objective:     Pain with R side bending on the R lumbar spine.  Pelvic landmarks level.    Exercise #1: Ab sets/LE extension  Comment #1: HEP  Exercise #2: Slump sliders  Comment #2: HEP  Exercise #3: PPT  Comment #3: HEP  Exercise #4: Anterior ilial rotation MET  Comment #4: Added ant/post MET x 3 reps  Exercise #5: Bridging  Comment #5: Bridge march x 10  Exercise #6: Piriformis stretch  Comment #6: HEP  Exercise #7: Hip flexor stretch  Comment #7: HEP  Exercise #8: Clamshell  Comment #8: HEP  Exercise #9: Cat/Cow  Comment #9: HEP  Exercise #10: LTR  Comment #10: HEP    Appt time: 7:31AM - 7:58AM    Treatment Today     TREATMENT MINUTES COMMENTS   Evaluation     Self-care/ Home management     Manual therapy 15 Prone PA mobs to L4-5 grades I-III  Prone PA mobs to L4/5 and L3/4 facets grades I-III   Neuromuscular Re-education     Therapeutic Activity     Therapeutic Exercises 12 NUSTEP x 5 minutes WL 6.0; subjective measures taken  See flowsheet   Gait training     Modality__________________                Total 27    Blank areas are intentional and mean the treatment did not include these items.       Jennifer Rodriguez, PT, DPT  8/31/2018

## 2021-06-20 NOTE — PROGRESS NOTES
[facility] Daily Progress     Patient Name: Deyanira Kelley  Date: 9/14/2018  Visit #: 9  PTA visit #:  NA  Referral Diagnosis: Lumbalgia  Referring provider: Ketty Moscoso PA-C  Visit Diagnosis:     ICD-10-CM    1. SI (sacroiliac) joint dysfunction M53.3    2. Right lumbar radiculitis M54.16    3. Muscle weakness (generalized) M62.81      Deyanira Kelley is a 53 y.o. female who presents to therapy today with chief complaints of right low back pain with radiation into her R leg. Onset date of sx was January 2018 with worsening symptoms 7/11/18.  Pt reported h/o hypercholesterolemia, type 2 diabetes, hyperlipidemia, major depression, anxiety, obesity, and lymphocitic colitis.  Pain symptoms are dull in nature with episodes of sharp pain.  She has not had LE symptoms since her ED visit on 7/11/18.  Functional impairments include recreational activities and housework.  Pt demo's signs and sx consistent with SI joint dysfunction with likely R anterior ilial rotation and L4/L5 lumbar radiculitis with facet pathology.     Precautions / Restrictions : None    Assessment:     HEP/POC compliance is  good .  The patient demonstrates independence in self-management of symptoms.  She continues to have episodes of pain infrequently.  She is appropriate to continue independently and will return to PT if needed.  Patient's chart will be held 30 days.    Goal Status:  Pt. will be independent with home exercise program in : 6 weeks;Progressing toward  Pt will: be able to do recreational activities without pain; in 8 weeks; progressing toward  Pt will: be able to perform gardening and housework without pain; in 8 weeks; Met  Pt will: be able to work a full day without pain; in 8 weeks; Met    Plan / Patient Education:     Hold chart.  Patient to continue independently since she is good with self-management of symptoms.  PT with check in with patient in 2 weeks to assess progress/self-management further.  Will determine POC at  "that time.    Subjective:     Pain Rating: .5  The patient reports that she has been able to play volleyball and box a few times.  She was at a wedding over Labor Day weekend and danced a lot.  She was a little sore afterwards, but it wasn't bad.  She still has episodes of pain but knows how to deal with them.    Objective:     Mild pain with lumbar flexion, extension, and R side bending on the R lumbar spine.  Pelvic landmarks level.    Exercise #1: Ab sets/LE extension  Comment #1: HEP  Exercise #2: Slump sliders  Comment #2: HEP  Exercise #3: PPT  Comment #3: HEP  Exercise #4: Anterior ilial rotation MET  Comment #4: Ant/post MET HEP  Exercise #5: Bridging  Comment #5: Bridge march HEP  Exercise #6: Piriformis stretch  Comment #6: HEP  Exercise #7: Hip flexor stretch  Comment #7: HEP  Exercise #8: Clamshell  Comment #8: HEP  Exercise #9: Cat/Cow  Comment #9: HEP  Exercise #10: LTR  Comment #10: HEP  Exercise #11: QL stretch  Comment #11: R side 30\"    Appt time: 7:32M - 7:58AM    Treatment Today     TREATMENT MINUTES COMMENTS   Evaluation     Self-care/ Home management     Manual therapy 15 Prone PA mobs to L4/5 and L3/4 R facets grades I-III  L S/L R lumbar rotation mobs grades I-III   Neuromuscular Re-education     Therapeutic Activity     Therapeutic Exercises 11 NUSTEP x 5 minutes WL 5.0; subjective measures taken  See flowsheet  Discussed POC   Gait training     Modality__________________                Total 26    Blank areas are intentional and mean the treatment did not include these items.       Jennifer Rodriguez, PT, DPT  9/14/2018  "

## 2021-06-21 NOTE — PROGRESS NOTES
Assessment/ Plan     1. Diarrhea  2. Lymphocytic colitis    Reviewed notes from [facility] from the visit in April 2018  Recommend keeping a food diary  She has been taking Pepto-Bismol  Follow-up with gastroenterology as needed  - Basic Metabolic Panel      3. Uncontrolled type 2 diabetes mellitus with hyperglycemia (H)    Unfortunately, he will globin A1c was elevated to 9.8%  Discussed the absolute importance of improved blood sugar control  She follows up with Dr. Lopez, endocrinology  Continue Lantus as well as glipizide and metformin  Recommend improvements in diet and exercise  Recommend an annual physical examination    4. Anxiety    A PHQ-9 score is 5  Reviewed her anxiety as well  Per her preference sertraline will be weaned  Will change to Effexor X are 37.5 mg this may be increased to 75 mg  Review potential side effects    5. Common wart versus corn    Recommend symptomatic treatment  Consider use of compound W or Duofilm    6. Menopausal syndrome (hot flashes)    We will check an FSH  - Follicle Stimulating Hormone (FSH)  Effexor may help with hot flashes    7. Leg cramps    Recommend increased fluid intake  Check a magnesium level and thyroid Cascade  - Magnesium  - Thyroid Cascade    Recommend follow-up with an update in 4 weeks  Follow-up sooner as needed      Subjective:       Deyanira Kelley is a 53 y.o. female who presents to the clinic to review multiple medical issues.  She has a complex medical history including type 2 diabetes mellitus which is inadequately controlled and is followed by endocrinology.  She is a history of hyperlipidemia, chronic diarrhea with lymphocytic colitis, depression, as well as anxiety.  She has not been seen by this physician since 2017.  She continues to follow-up with Dr. Lopez, endocrinology.  In September of 2018 her hemoglobin A1c was 9.8%.  She is currently treated with Lantus 25 units as well as metformin and glipizide.  Unfortunately, her  blood sugars have been higher.  She does have a known history of lymphocytic colitis.  She has 5-6 loose stools on many days and will experience cramping as well.  She has a long-standing history of depression associated with anxiety.  She completed a PHQ-9 questionnaire and scored a total of 5 points.  Her mood has generally been stable.  However, she can feel anxious at times.  She would be willing to consider a medication change.  She has had significant stressors at times.  She is postmenopausal.  She has periods occasional hot flashes.  Also, she notes that she has a possible wart on the bottom of one foot.  This can cause some tenderness. Review of systems otherwise negative for headache, dizziness, chest pain, palpitations, bowel changes, or urinary concerns.  She does experience occasional hot flashes and likely is perimenopausal.  She has experienced occasional leg cramps.    The following portions of the patient's history were reviewed and updated as appropriate: allergies, current medications, past family history, past medical history, past social history, past surgical history and problem list.    Review of Systems   A 12 point comprehensive review of systems was negative except as noted.      Current Outpatient Medications   Medication Sig Dispense Refill     atorvastatin (LIPITOR) 20 MG tablet Take 20 mg by mouth daily.        bismuth subsalicylate (BISMUTH SUBSALICYLATE) 262 mg Chew chew tab Chew 3 tablets 3 (three) times a day.       blood glucose test (ONETOUCH VERIO) strips Test 5 times per day.       glipiZIDE (GLUCOTROL XL) 2.5 MG 24 hr tablet Take 15 mg by mouth.       insulin glargine (LANTUS SOLOSTAR U-100 INSULIN) 100 unit/mL (3 mL) pen Inject 20 Units under the skin every morning.        metFORMIN (GLUCOPHAGE-XR) 500 MG 24 hr tablet Take 2,000 mg by mouth daily.        nicotine, polacrilex, (NICORETTE) 2 mg lozenge Apply 2 mg to cheek.       ONETOUCH DELICA LANCETS 30 gauge Misc         "tiZANidine (ZANAFLEX) 2 MG tablet Take 1-2 tablets (2-4 mg total) by mouth every 8 (eight) hours as needed. 28 tablet 0     venlafaxine (EFFEXOR XR) 37.5 MG 24 hr capsule Take one PO Qday for one week then increase to two PO Qday 60 capsule 2     No current facility-administered medications for this visit.        Objective:      /64   Pulse 80   Temp 98.3  F (36.8  C) (Oral)   Resp 16   Ht 5' 7\" (1.702 m)   Wt 176 lb (79.8 kg)   LMP  (LMP Unknown)   BMI 27.57 kg/m        General appearance: alert, appears stated age and cooperative  Head: Normocephalic, without obvious abnormality, atraumatic  Eyes: conjunctivae/corneas clear. PERRL, EOM's intact.   Nose: Nares normal. Septum midline. Mucosa normal. No drainage or sinus tenderness.  Throat: lips, mucosa, and tongue normal; teeth and gums normal  Neck: no adenopathy, supple, symmetrical, trachea midline   Back: symmetric, no curvature. ROM normal. No CVA tenderness.  Lungs: clear to auscultation bilaterally  Heart: regular rate and rhythm, S1, S2 normal, no murmur, click, rub or gallop  Abdomen: soft, non-tender; bowel sounds normal; no masses,  no organomegaly  Extremities: extremities normal, atraumatic, no cyanosis or edema  Examination of the plantar aspect of left foot reveals a small corn  Skin: Skin color, texture, turgor normal. No rashes or lesions  Lymph nodes: Cervical nodes normal.  Neurologic: Alert and oriented X 3         No results found for this or any previous visit (from the past 168 hour(s)).       This note has been dictated using voice recognition software. Any grammatical or context distortions are unintentional and inherent to the software  "

## 2021-06-22 NOTE — PROGRESS NOTES
[facility] Discharge Summary  Patient Name: Deyanira Kelley  Date: 12/13/2018  Referral Diagnosis:  Lumbalgia  Referring provider: Ketty Moscoso PA-C   Visit Diagnosis:     ICD-10-CM    1. SI (sacroiliac) joint dysfunction M53.3    2. Right lumbar radiculitis M54.16    3. Muscle weakness (generalized) M62.81    4. Anxiety F41.9        Goals:  Pt. will be independent with home exercise program in : 6 weeks;Met    Pt will: be able to do recreational activities without pain; in 8 weeks; Almost Met  Pt will: be able to perform gardening and housework without pain; in 8 weeks; Met  Pt will: be able to work a full day without pain; in 8 weeks; Met      Patient was seen for 9 visits from 7/19/18 to 9/14/18 with 0 missed appointments.  No further therapy is required at this time.  The patient's chart was held for 30 days.  Patient did not return to PT, so will assume she is doing well.  She is appropriate for discharge from PT services at this time.    Home exercise program given to patient:  Exercise #1: Ab sets/LE extension  Comment #1: HEP  Exercise #2: Slump sliders  Comment #2: HEP  Exercise #3: PPT  Comment #3: HEP  Exercise #4: Anterior ilial rotation MET  Comment #4: Ant/post MET HEP  Exercise #5: Bridging  Comment #5: Bridge march HEP  Exercise #6: Piriformis stretch  Comment #6: HEP  Exercise #7: Hip flexor stretch  Comment #7: HEP  Exercise #8: Clamshell  Comment #8: HEP  Exercise #9: Cat/Cow  Comment #9: HEP  Exercise #10: LTR  Comment #10: HEP  Exercise #11: QL stretch  Comment #11: HEP      Therapy will be discontinued at this time.  The patient will need a new referral to resume.    Thank you for your referral.  Jennifer Rodriguez, PT, DPT  12/13/2018  2:15 PM

## 2021-07-03 NOTE — ADDENDUM NOTE
Addendum Note by Jak Jovel MD at 10/4/2017 10:09 AM     Author: Jak Jovel MD Service: -- Author Type: Physician    Filed: 10/4/2017 10:09 AM Encounter Date: 10/3/2017 Status: Signed    : Jak Jovel MD (Physician)    Addended by: JAK JOVEL on: 10/4/2017 10:09 AM        Modules accepted: Orders

## 2021-07-03 NOTE — ADDENDUM NOTE
Addendum Note by Carli Brody MD at 7/12/2018 11:27 AM     Author: Carli Brody MD Service: -- Author Type: Physician    Filed: 7/12/2018 11:27 AM Encounter Date: 7/12/2018 Status: Signed    : Carli Brody MD (Physician)    Addended by: CARLI BRODY on: 7/12/2018 11:27 AM        Modules accepted: Orders

## 2021-07-14 DIAGNOSIS — F41.9 ANXIETY: Primary | ICD-10-CM

## 2021-07-18 NOTE — TELEPHONE ENCOUNTER
"sertraline (ZOLOFT) 50 MG tablet 90 tablet 2 10/6/2020  --   Sig - Route: Take 1 tablet (50 mg total) by mouth daily. - Oral   Sent to pharmacy as: sertraline 50 mg tablet (Zoloft)   E-Prescribing Status: Receipt confirmed by pharmacy (10/6/2020  3:54 PM CDT)       Routing refill request to provider for review/approval because:  Drug interaction warning    Last Written Prescription Date:  10/6/20  Last Fill Quantity: 90,  # refills: 2   Last office visit provider:  10/5/20     Requested Prescriptions   Pending Prescriptions Disp Refills     sertraline (ZOLOFT) 50 MG tablet [Pharmacy Med Name: SERTRALINE HCL TABS 50MG] 90 tablet 0     Sig: TAKE 1 TABLET DAILY       SSRIs Protocol Passed - 7/14/2021  9:37 AM        Passed - Recent (12 mo) or future (30 days) visit within the authorizing provider's specialty     Patient has had an office visit with the authorizing provider or a provider within the authorizing providers department within the previous 12 mos or has a future within next 30 days. See \"Patient Info\" tab in inbasket, or \"Choose Columns\" in Meds & Orders section of the refill encounter.              Passed - Medication is active on med list        Passed - Patient is age 18 or older        Passed - No active pregnancy on record        Passed - No positive pregnancy test in last 12 months             Main Adame RN 07/18/21 9:18 AM  "

## 2021-07-21 ENCOUNTER — RECORDS - HEALTHEAST (OUTPATIENT)
Dept: ADMINISTRATIVE | Facility: CLINIC | Age: 56
End: 2021-07-21

## 2021-07-31 ENCOUNTER — HEALTH MAINTENANCE LETTER (OUTPATIENT)
Age: 56
End: 2021-07-31

## 2021-09-25 ENCOUNTER — HEALTH MAINTENANCE LETTER (OUTPATIENT)
Age: 56
End: 2021-09-25

## 2022-08-27 ENCOUNTER — HEALTH MAINTENANCE LETTER (OUTPATIENT)
Age: 57
End: 2022-08-27

## 2023-01-07 ENCOUNTER — HEALTH MAINTENANCE LETTER (OUTPATIENT)
Age: 58
End: 2023-01-07

## 2023-06-02 ENCOUNTER — HEALTH MAINTENANCE LETTER (OUTPATIENT)
Age: 58
End: 2023-06-02

## 2024-02-10 ENCOUNTER — HEALTH MAINTENANCE LETTER (OUTPATIENT)
Age: 59
End: 2024-02-10

## 2024-04-05 ENCOUNTER — TRANSFERRED RECORDS (OUTPATIENT)
Dept: HEALTH INFORMATION MANAGEMENT | Facility: CLINIC | Age: 59
End: 2024-04-05
Payer: COMMERCIAL

## 2024-05-13 ENCOUNTER — TRANSFERRED RECORDS (OUTPATIENT)
Dept: HEALTH INFORMATION MANAGEMENT | Facility: CLINIC | Age: 59
End: 2024-05-13
Payer: COMMERCIAL